# Patient Record
Sex: FEMALE | Race: WHITE | Employment: OTHER | URBAN - METROPOLITAN AREA
[De-identification: names, ages, dates, MRNs, and addresses within clinical notes are randomized per-mention and may not be internally consistent; named-entity substitution may affect disease eponyms.]

---

## 2017-02-02 ENCOUNTER — OFFICE VISIT (OUTPATIENT)
Dept: FAMILY MEDICINE CLINIC | Facility: CLINIC | Age: 47
End: 2017-02-02

## 2017-02-02 VITALS
TEMPERATURE: 98 F | RESPIRATION RATE: 16 BRPM | DIASTOLIC BLOOD PRESSURE: 70 MMHG | OXYGEN SATURATION: 98 % | BODY MASS INDEX: 21.07 KG/M2 | HEART RATE: 62 BPM | HEIGHT: 68 IN | WEIGHT: 139 LBS | SYSTOLIC BLOOD PRESSURE: 100 MMHG

## 2017-02-02 DIAGNOSIS — R93.1 ABNORMAL HEART SCORE CT: ICD-10-CM

## 2017-02-02 DIAGNOSIS — E55.9 VITAMIN D DEFICIENCY: ICD-10-CM

## 2017-02-02 DIAGNOSIS — Z82.49 FAMILY HISTORY OF CORONARY ARTERY DISEASE: ICD-10-CM

## 2017-02-02 DIAGNOSIS — E78.00 HYPERCHOLESTEREMIA: Primary | ICD-10-CM

## 2017-02-02 PROCEDURE — 99214 OFFICE O/P EST MOD 30 MIN: CPT | Performed by: FAMILY MEDICINE

## 2017-02-02 RX ORDER — ATORVASTATIN CALCIUM 10 MG/1
10 TABLET, FILM COATED ORAL DAILY
Qty: 30 TABLET | Refills: 2 | Status: SHIPPED | OUTPATIENT
Start: 2017-02-02 | End: 2018-02-26

## 2017-02-02 NOTE — PROGRESS NOTES
Dat Copeland is a 55year old female. cc hyperlipidemia abnormal ultrafast CAT scan results, vitamin D deficiency family history of coronary artery disease. HPI:   Patient is coming to discuss hyperlipidemia. Her cholesterol is always elevated.   She is a lesions  HEENT: atraumatic, normocephalic,ears and throat are clear  NECK: supple,no adenopathy,  LUNGS: clear to auscultation  CARDIO: RRR without murmur  GI: good BS's,no masses, HSM or tenderness  EXTREMITIES: no cyanosis, clubbing or edema  Psychiatric

## 2017-02-02 NOTE — PATIENT INSTRUCTIONS
Do fasting blood work. Start atorvastatin 10 mg 1 tablet daily in the morning after you hear from us about your results. Continue exercise. Low-fat diet. Follow-up in 3 months after blood work. Vitamin D  Does this test have other names?   25-hydroxyvi provider may also want to check your parathyroid hormone levels and your calcium levels.   What do my test results mean?   A result for a lab test may be affected by many things, including the method the laboratory uses to do the test. If your test results taking. This includes medicines that don't need a prescription and any illicit drugs you may use.   Date Last Reviewed: 10/12/2015  © 6314-1549 The [facility], [address] [place] [address]. All rights reserved.  This information is n pain, upset  What may interact with this medicine?   Do not take this medicine with any of the following medications:  · red yeast rice  · telaprevir  · telithromycin  · voriconazole  This medicine may also interact with the following medications:  · alcoho professional may tell you to stop taking this medicine if you develop muscle problems.  If your muscle problems do not go away after stopping this medicine, contact your health care professional.  This drug is only part of a total heart-health program. Your

## 2018-01-31 ENCOUNTER — APPOINTMENT (OUTPATIENT)
Dept: LAB | Age: 48
End: 2018-01-31
Attending: FAMILY MEDICINE
Payer: COMMERCIAL

## 2018-01-31 DIAGNOSIS — E78.00 HYPERCHOLESTEREMIA: ICD-10-CM

## 2018-01-31 DIAGNOSIS — E55.9 VITAMIN D DEFICIENCY: ICD-10-CM

## 2018-01-31 LAB
25-HYDROXYVITAMIN D (TOTAL): 44.7 NG/ML (ref 30–100)
ALBUMIN SERPL-MCNC: 3.8 G/DL (ref 3.5–4.8)
ALP LIVER SERPL-CCNC: 60 U/L (ref 39–100)
ALT SERPL-CCNC: 22 U/L (ref 14–54)
AST SERPL-CCNC: 12 U/L (ref 15–41)
BILIRUB SERPL-MCNC: 0.7 MG/DL (ref 0.1–2)
BUN BLD-MCNC: 9 MG/DL (ref 8–20)
CALCIUM BLD-MCNC: 9.3 MG/DL (ref 8.3–10.3)
CHLORIDE: 107 MMOL/L (ref 101–111)
CHOLEST SMN-MCNC: 222 MG/DL (ref ?–200)
CO2: 26 MMOL/L (ref 22–32)
CREAT BLD-MCNC: 0.71 MG/DL (ref 0.55–1.02)
GLUCOSE BLD-MCNC: 73 MG/DL (ref 70–99)
HDLC SERPL-MCNC: 93 MG/DL (ref 45–?)
HDLC SERPL: 2.39 {RATIO} (ref ?–4.44)
LDLC SERPL CALC-MCNC: 117 MG/DL (ref ?–130)
M PROTEIN MFR SERPL ELPH: 7.6 G/DL (ref 6.1–8.3)
NONHDLC SERPL-MCNC: 129 MG/DL (ref ?–130)
POTASSIUM SERPL-SCNC: 4.3 MMOL/L (ref 3.6–5.1)
SODIUM SERPL-SCNC: 139 MMOL/L (ref 136–144)
TRIGL SERPL-MCNC: 61 MG/DL (ref ?–150)
VLDLC SERPL CALC-MCNC: 12 MG/DL (ref 5–40)

## 2018-01-31 PROCEDURE — 36415 COLL VENOUS BLD VENIPUNCTURE: CPT | Performed by: FAMILY MEDICINE

## 2018-01-31 PROCEDURE — 80061 LIPID PANEL: CPT | Performed by: FAMILY MEDICINE

## 2018-01-31 PROCEDURE — 80053 COMPREHEN METABOLIC PANEL: CPT | Performed by: FAMILY MEDICINE

## 2018-01-31 PROCEDURE — 82306 VITAMIN D 25 HYDROXY: CPT | Performed by: FAMILY MEDICINE

## 2018-02-26 ENCOUNTER — OFFICE VISIT (OUTPATIENT)
Dept: FAMILY MEDICINE CLINIC | Facility: CLINIC | Age: 48
End: 2018-02-26

## 2018-02-26 VITALS
BODY MASS INDEX: 21.03 KG/M2 | HEIGHT: 68.75 IN | DIASTOLIC BLOOD PRESSURE: 68 MMHG | WEIGHT: 142 LBS | SYSTOLIC BLOOD PRESSURE: 116 MMHG | RESPIRATION RATE: 16 BRPM | TEMPERATURE: 98 F | HEART RATE: 98 BPM

## 2018-02-26 DIAGNOSIS — R20.0 NUMBNESS AND TINGLING OF UPPER AND LOWER EXTREMITIES OF BOTH SIDES: ICD-10-CM

## 2018-02-26 DIAGNOSIS — E78.00 HYPERCHOLESTEREMIA: Primary | ICD-10-CM

## 2018-02-26 DIAGNOSIS — R20.2 NUMBNESS AND TINGLING OF UPPER AND LOWER EXTREMITIES OF BOTH SIDES: ICD-10-CM

## 2018-02-26 DIAGNOSIS — Z00.00 LABORATORY TESTS ORDERED AS PART OF A COMPLETE PHYSICAL EXAM (CPE): ICD-10-CM

## 2018-02-26 DIAGNOSIS — M54.2 NECK PAIN: ICD-10-CM

## 2018-02-26 PROCEDURE — 99214 OFFICE O/P EST MOD 30 MIN: CPT | Performed by: FAMILY MEDICINE

## 2018-02-26 RX ORDER — METHYLPREDNISOLONE 4 MG/1
TABLET ORAL
Qty: 1 KIT | Refills: 0 | Status: SHIPPED | OUTPATIENT
Start: 2018-02-26 | End: 2019-08-22 | Stop reason: ALTCHOICE

## 2018-02-26 RX ORDER — CYCLOBENZAPRINE HCL 10 MG
10 TABLET ORAL NIGHTLY
Qty: 30 TABLET | Refills: 0 | Status: SHIPPED | OUTPATIENT
Start: 2018-02-26 | End: 2018-03-28

## 2018-02-26 RX ORDER — ATORVASTATIN CALCIUM 10 MG/1
10 TABLET, FILM COATED ORAL DAILY
Qty: 30 TABLET | Refills: 2 | Status: SHIPPED | OUTPATIENT
Start: 2018-02-26 | End: 2018-05-09

## 2018-02-27 NOTE — PROGRESS NOTES
Harpal Ba is a 52year old female. cc hypercholesterolemia, numbness and tingling of the extremities  HPI:   Coming to discuss hypercholesterolemia. She had a blood work done last year cholesterol was elevated.   Ultrafast heart scan showed some the pos otherwise  SKIN: denies any unusual skin lesions or rashes  HEENT no congestion no runny nose no sore throat  Neck  neck pain  RESPIRATORY: denies shortness of breath with exertion  CARDIOVASCULAR: denies chest pain on exertion  GI: denies abdominal pain a will require MRI of her neck and neurosurgical evaluation. I suggested to use some topical icy hot do not get massages of the neck.     Orders Placed This Encounter      COMP METABOLIC PANEL      LIPID PANEL      TSH W REFLEX TO FREE T4      CBC W DIFFEREN

## 2018-05-10 RX ORDER — ATORVASTATIN CALCIUM 10 MG/1
TABLET, FILM COATED ORAL
Qty: 60 TABLET | Refills: 0 | Status: SHIPPED | OUTPATIENT
Start: 2018-05-10 | End: 2018-09-07

## 2018-06-15 ENCOUNTER — OFFICE VISIT (OUTPATIENT)
Dept: FAMILY MEDICINE CLINIC | Facility: CLINIC | Age: 48
End: 2018-06-15

## 2018-06-15 VITALS
BODY MASS INDEX: 21.37 KG/M2 | DIASTOLIC BLOOD PRESSURE: 72 MMHG | RESPIRATION RATE: 15 BRPM | HEART RATE: 67 BPM | HEIGHT: 68 IN | WEIGHT: 141 LBS | SYSTOLIC BLOOD PRESSURE: 114 MMHG

## 2018-06-15 DIAGNOSIS — Z00.00 PHYSICAL EXAM, ANNUAL: Primary | ICD-10-CM

## 2018-06-15 DIAGNOSIS — R94.6 ABNORMAL FINDING ON EXAMINATION OF THYROID GLAND: ICD-10-CM

## 2018-06-15 PROCEDURE — 99396 PREV VISIT EST AGE 40-64: CPT | Performed by: FAMILY MEDICINE

## 2018-06-15 NOTE — PROGRESS NOTES
HPI:   Cong Roberts is a 50year old female who presents for a complete physical exam. Symptoms: denies discharge, itching, burning or dysuria. Patient has no  Complains.        Immunization History  Administered            Date(s) Administered    [facility] GENERAL: feels well otherwise  SKIN: denies any unusual skin lesions  EYES:denies blurred vision or double vision  HEENT: denies nasal congestion, sinus pain or ST  LUNGS: denies shortness of breath with exertion  CARDIOVASCULAR: denies chest pain on exe maintenance, will check fasting Lipids, CMP, and CBC. Pt will be at 48 referred for screening colonoscopy. Pt' s weight is Body mass index is 21.44 kg/m². , recommended low carb diet and aerobic exercise 30 minutes three times weekly.   The patient indicates

## 2018-08-30 ENCOUNTER — HOSPITAL ENCOUNTER (OUTPATIENT)
Dept: ULTRASOUND IMAGING | Facility: HOSPITAL | Age: 48
Discharge: HOME | End: 2018-08-30
Attending: FAMILY MEDICINE
Payer: COMMERCIAL

## 2018-08-30 DIAGNOSIS — R94.6 ABNORMAL FINDING ON EXAMINATION OF THYROID GLAND: ICD-10-CM

## 2018-08-30 PROCEDURE — 76536 US EXAM OF HEAD AND NECK: CPT | Performed by: FAMILY MEDICINE

## 2018-09-07 ENCOUNTER — TELEPHONE (OUTPATIENT)
Dept: FAMILY MEDICINE CLINIC | Facility: CLINIC | Age: 48
End: 2018-09-07

## 2018-09-07 NOTE — TELEPHONE ENCOUNTER
Per chart u/s of thyroid is normal.  I have advised pt of this. She voiced understanding. Reports she was having trouble logging into her mychart.

## 2018-09-12 RX ORDER — ATORVASTATIN CALCIUM 10 MG/1
TABLET, FILM COATED ORAL
Qty: 30 TABLET | Refills: 0 | Status: SHIPPED | OUTPATIENT
Start: 2018-09-12 | End: 2018-09-12

## 2018-09-13 RX ORDER — ATORVASTATIN CALCIUM 10 MG/1
10 TABLET, FILM COATED ORAL
Qty: 30 TABLET | Refills: 2 | Status: SHIPPED | OUTPATIENT
Start: 2018-09-13 | End: 2019-03-10

## 2018-10-16 ENCOUNTER — LAB ENCOUNTER (OUTPATIENT)
Dept: LAB | Age: 48
End: 2018-10-16
Attending: FAMILY MEDICINE
Payer: COMMERCIAL

## 2018-10-16 DIAGNOSIS — E78.00 HYPERCHOLESTEREMIA: ICD-10-CM

## 2018-10-16 DIAGNOSIS — Z00.00 LABORATORY TESTS ORDERED AS PART OF A COMPLETE PHYSICAL EXAM (CPE): ICD-10-CM

## 2018-10-16 PROCEDURE — 80061 LIPID PANEL: CPT | Performed by: FAMILY MEDICINE

## 2018-10-16 PROCEDURE — 36415 COLL VENOUS BLD VENIPUNCTURE: CPT | Performed by: FAMILY MEDICINE

## 2018-10-16 PROCEDURE — 80050 GENERAL HEALTH PANEL: CPT | Performed by: FAMILY MEDICINE

## 2018-10-16 PROCEDURE — 81003 URINALYSIS AUTO W/O SCOPE: CPT | Performed by: FAMILY MEDICINE

## 2018-10-18 RX ORDER — ATORVASTATIN CALCIUM 10 MG/1
TABLET, FILM COATED ORAL
Qty: 30 TABLET | Refills: 0 | Status: SHIPPED | OUTPATIENT
Start: 2018-10-18 | End: 2019-08-22

## 2019-02-18 ENCOUNTER — TELEPHONE (OUTPATIENT)
Dept: FAMILY MEDICINE CLINIC | Facility: CLINIC | Age: 49
End: 2019-02-18

## 2019-02-18 NOTE — TELEPHONE ENCOUNTER
Pt called. Pt states both her spouse and mother have the Shingles right now. Pt states she's not 48years old yet and that her insurance won't cover the vaccine.   She's aware that we don't have a shingles vaccine right now and she said that she will go t

## 2019-02-18 NOTE — TELEPHONE ENCOUNTER
Patient was advised she can not have the shingles vaccine until she is 50. Patient verbalized understanding.

## 2019-02-18 NOTE — TELEPHONE ENCOUNTER
Unfortunately  shingles  vaccination is not approved for patient below 48years of age. Patient cannot receive the vaccination because  she is too young. .  She cannot get a shingles from her mother-in-law. If she had chickenpox she should be safe.   When

## 2019-02-18 NOTE — TELEPHONE ENCOUNTER
I explained to patient if she has had the chicken pox she can not get the shingles from her mother in law. Shingles is contagious to those who have not had the chicken pox or those that have not been vaccinated for the chicken pox.  Patient is still wanting

## 2019-03-11 RX ORDER — ATORVASTATIN CALCIUM 10 MG/1
TABLET, FILM COATED ORAL
Qty: 30 TABLET | Refills: 0 | Status: SHIPPED | OUTPATIENT
Start: 2019-03-11 | End: 2019-03-22

## 2019-03-22 ENCOUNTER — TELEPHONE (OUTPATIENT)
Dept: FAMILY MEDICINE CLINIC | Facility: CLINIC | Age: 49
End: 2019-03-22

## 2019-03-22 RX ORDER — ATORVASTATIN CALCIUM 10 MG/1
TABLET, FILM COATED ORAL
Qty: 90 TABLET | Refills: 0 | Status: SHIPPED | OUTPATIENT
Start: 2019-03-22 | End: 2019-06-22

## 2019-03-22 NOTE — TELEPHONE ENCOUNTER
Patient would like to know if Dr. Royce Emerson will prescribe 90 days instead of 30      ATORVASTATIN 10 MG Oral Tab    Thank you

## 2019-03-22 NOTE — TELEPHONE ENCOUNTER
See below. Pt had cpx in June. Notes say to return in year. Last lipid 10/18  Can give her 80 and remind for appt?

## 2019-06-26 NOTE — TELEPHONE ENCOUNTER
ATORVASTATIN 10MG TABLETS    Cholesterol Medication Protocol Failed    The pt made an appt for 10/24/2019. Please see pended medications. Please sign if appropriate.       Thank you

## 2019-06-26 NOTE — TELEPHONE ENCOUNTER
Please give patient a call. I would like her to schedule her appointment sooner we can see her at the end of August okay to use 30 minutes for her physical at any time.   Thank you

## 2019-07-02 RX ORDER — ATORVASTATIN CALCIUM 10 MG/1
TABLET, FILM COATED ORAL
Qty: 90 TABLET | Refills: 0 | Status: SHIPPED | OUTPATIENT
Start: 2019-07-02 | End: 2019-09-03

## 2019-08-22 ENCOUNTER — OFFICE VISIT (OUTPATIENT)
Dept: FAMILY MEDICINE CLINIC | Facility: CLINIC | Age: 49
End: 2019-08-22
Payer: COMMERCIAL

## 2019-08-22 VITALS
HEART RATE: 80 BPM | WEIGHT: 142 LBS | RESPIRATION RATE: 14 BRPM | HEIGHT: 68 IN | DIASTOLIC BLOOD PRESSURE: 76 MMHG | SYSTOLIC BLOOD PRESSURE: 108 MMHG | BODY MASS INDEX: 21.52 KG/M2 | OXYGEN SATURATION: 99 % | TEMPERATURE: 99 F

## 2019-08-22 DIAGNOSIS — Z12.11 SCREEN FOR COLON CANCER: ICD-10-CM

## 2019-08-22 DIAGNOSIS — Z13.89 SCREENING FOR GENITOURINARY CONDITION: ICD-10-CM

## 2019-08-22 DIAGNOSIS — E55.9 VITAMIN D DEFICIENCY: ICD-10-CM

## 2019-08-22 DIAGNOSIS — Z00.00 PHYSICAL EXAM, ANNUAL: Primary | ICD-10-CM

## 2019-08-22 DIAGNOSIS — Z00.00 LABORATORY EXAMINATION ORDERED AS PART OF A ROUTINE GENERAL MEDICAL EXAMINATION: ICD-10-CM

## 2019-08-22 PROCEDURE — 99396 PREV VISIT EST AGE 40-64: CPT | Performed by: FAMILY MEDICINE

## 2019-08-22 NOTE — PROGRESS NOTES
HPI:   Stan Angeles is a 52year old female who presents for a complete physical exam. Symptoms: denies discharge, itching, burning or dysuria. Patient has no  Complains. She is taking on cholesterol-lowering medication.   Family history of hypercholester watches calories closely     REVIEW OF SYSTEMS:   GENERAL: feels well otherwise  SKIN: denies any unusual skin lesions  EYES:denies blurred vision or double vision  HEENT: denies nasal congestion, sinus pain or ST  LUNGS: denies shortness of breath with ex W/DIFF      COMP METABOLIC PANEL      LIPID PANEL      TSH W REFLEX TO FREE T4      UA/M with Culture Reflex      VITAMIN D, 25-HYDROXY      Meds & Refills for this Visit:  Requested Prescriptions      No prescriptions requested or ordered in this encounte

## 2019-08-22 NOTE — PATIENT INSTRUCTIONS
Return for fasting blood work. Healthy diet. Stay active. Call GI Dr. Lord Montez  for colonoscopy after your birthday. Come back for your tetanus shot any time. Do shingles shot after your birthday next year.

## 2019-08-27 RX ORDER — ATORVASTATIN CALCIUM 10 MG/1
TABLET, FILM COATED ORAL
Qty: 30 TABLET | Refills: 0 | OUTPATIENT
Start: 2019-08-27

## 2019-09-03 ENCOUNTER — TELEPHONE (OUTPATIENT)
Dept: FAMILY MEDICINE CLINIC | Facility: CLINIC | Age: 49
End: 2019-09-03

## 2019-09-03 RX ORDER — ATORVASTATIN CALCIUM 10 MG/1
TABLET, FILM COATED ORAL
Qty: 90 TABLET | Refills: 0 | Status: SHIPPED | OUTPATIENT
Start: 2019-09-03 | End: 2019-12-03

## 2019-09-03 NOTE — TELEPHONE ENCOUNTER
Per chart 7/2 #90 was sent. I called [facility] to confirm. They never rec'd that refill. I have re-sent for pt.

## 2019-10-28 ENCOUNTER — OFFICE VISIT (OUTPATIENT)
Dept: FAMILY MEDICINE CLINIC | Facility: CLINIC | Age: 49
End: 2019-10-28
Payer: COMMERCIAL

## 2019-10-28 VITALS
OXYGEN SATURATION: 99 % | RESPIRATION RATE: 16 BRPM | HEIGHT: 68 IN | SYSTOLIC BLOOD PRESSURE: 104 MMHG | HEART RATE: 86 BPM | DIASTOLIC BLOOD PRESSURE: 64 MMHG | WEIGHT: 148 LBS | BODY MASS INDEX: 22.43 KG/M2 | TEMPERATURE: 99 F

## 2019-10-28 DIAGNOSIS — M54.2 NECK PAIN: Primary | ICD-10-CM

## 2019-10-28 DIAGNOSIS — M54.12 CERVICAL RADICULOPATHY: ICD-10-CM

## 2019-10-28 PROCEDURE — 99214 OFFICE O/P EST MOD 30 MIN: CPT | Performed by: FAMILY MEDICINE

## 2019-10-28 RX ORDER — METHYLPREDNISOLONE 4 MG/1
TABLET ORAL
Qty: 1 KIT | Refills: 0 | Status: SHIPPED | OUTPATIENT
Start: 2019-10-28 | End: 2020-06-04 | Stop reason: ALTCHOICE

## 2019-10-28 RX ORDER — CYCLOBENZAPRINE HCL 10 MG
10 TABLET ORAL NIGHTLY
Qty: 30 TABLET | Refills: 0 | Status: SHIPPED | OUTPATIENT
Start: 2019-10-28 | End: 2019-11-17

## 2019-10-28 NOTE — PATIENT INSTRUCTIONS
Start Medrol Dosepak tomorrow morning and take per directions. Cyclobenzaprine 10 mg at bedtime this is muscle relaxant take as we discussed in the office. Take rest from tennis right now. Continue warm compresses.   Call  to get an x-ray of

## 2019-10-28 NOTE — PROGRESS NOTES
Sammy Lamas is a 52year old female. cc neck pain, occasional shooting to the left arm. HPI:   She is coming to the office for evaluation of the neck pain. Initially evaluated in September 2016.   At that time she will start a Medrol Dosepak and her symp adult)   Pulse 86   Temp 98.9 °F (37.2 °C) (Oral)   Resp 16   Ht 68\"   Wt 148 lb (67.1 kg)   SpO2 99%   Breastfeeding?  No   BMI 22.50 kg/m²   GENERAL: well developed, well nourished,in no apparent distress  SKIN: no rashes,no suspicious lesions  HEENT: at Radiculopathy, cervical region M54.2 Cervicalgia     PATIENT STATED HISTORY: (As transcribed by Technologist)  Neck  discomfort,pain on and off. No injury.          FINDINGS:      BONES:  Stable alignment with slight reversal of the cervical lordosis centere

## 2019-10-29 ENCOUNTER — HOSPITAL ENCOUNTER (OUTPATIENT)
Dept: GENERAL RADIOLOGY | Age: 49
Discharge: HOME | End: 2019-10-29
Attending: FAMILY MEDICINE
Payer: COMMERCIAL

## 2019-10-29 DIAGNOSIS — M54.12 CERVICAL RADICULOPATHY: ICD-10-CM

## 2019-10-29 DIAGNOSIS — M54.2 NECK PAIN: ICD-10-CM

## 2019-10-29 PROCEDURE — 72052 X-RAY EXAM NECK SPINE 6/>VWS: CPT | Performed by: FAMILY MEDICINE

## 2019-12-06 RX ORDER — ATORVASTATIN CALCIUM 10 MG/1
TABLET, FILM COATED ORAL
Qty: 90 TABLET | Refills: 0 | Status: SHIPPED | OUTPATIENT
Start: 2019-12-06 | End: 2020-04-26

## 2019-12-06 NOTE — TELEPHONE ENCOUNTER
ATORVASTATIN 10MG TABLETS    Cholesterol Medication Protocol Failed    She has an upcoming appt scheduled for 12/19/2019. Please see pended medications. Please sign if appropriate.       Thank you

## 2020-02-03 ENCOUNTER — OFFICE VISIT (OUTPATIENT)
Dept: FAMILY MEDICINE CLINIC | Facility: CLINIC | Age: 50
End: 2020-02-03
Payer: COMMERCIAL

## 2020-02-03 VITALS
BODY MASS INDEX: 23 KG/M2 | SYSTOLIC BLOOD PRESSURE: 100 MMHG | DIASTOLIC BLOOD PRESSURE: 62 MMHG | HEART RATE: 72 BPM | WEIGHT: 148 LBS | OXYGEN SATURATION: 97 % | TEMPERATURE: 98 F

## 2020-02-03 DIAGNOSIS — M54.2 NECK PAIN: Primary | ICD-10-CM

## 2020-02-03 DIAGNOSIS — M54.12 CERVICAL RADICULOPATHY: ICD-10-CM

## 2020-02-03 PROCEDURE — 99213 OFFICE O/P EST LOW 20 MIN: CPT | Performed by: FAMILY MEDICINE

## 2020-02-03 NOTE — PROGRESS NOTES
Sergio Durán is a 52year old female. cc neck pain  HPI:   Patient is come to the office for reevaluation of the neck and upper back pain. Initially evaluated in September 2016.   At that time she will start a Medrol Dosepak and her symptoms got significant (36.4 °C) (Oral)   Wt 148 lb (67.1 kg)   SpO2 97%   BMI 22.50 kg/m²   GENERAL: well developed, well nourished,in no apparent distress  SKIN: no rashes,no suspicious lesions  HEENT: atraumatic, normocephalic,ears and throat are clear  NECK: supple,no adenop

## 2020-02-13 ENCOUNTER — MED REC SCAN ONLY (OUTPATIENT)
Dept: FAMILY MEDICINE CLINIC | Facility: CLINIC | Age: 50
End: 2020-02-13

## 2020-04-26 RX ORDER — ATORVASTATIN CALCIUM 10 MG/1
TABLET, FILM COATED ORAL
Qty: 90 TABLET | Refills: 0 | Status: SHIPPED | OUTPATIENT
Start: 2020-04-26 | End: 2020-07-29

## 2020-06-03 ENCOUNTER — TELEPHONE (OUTPATIENT)
Dept: FAMILY MEDICINE CLINIC | Facility: CLINIC | Age: 50
End: 2020-06-03

## 2020-06-03 NOTE — TELEPHONE ENCOUNTER
1. What are your symptoms? Pt has shooting pain by ribs that radiates to her chest and makes it hard to take a deep breath. 2. How long have you been having these symptoms?  sunday      3. Have you done anything already to treat your symptoms?

## 2020-06-03 NOTE — TELEPHONE ENCOUNTER
S/w pt. Reports a shooting pain in back near her ribs and radiates to chest.  sts her sx's come and goes. Since Sunday. Reports she has been looking up her sx's online and wonders if she has \"slipping rib syndrome\" or \"costochodritis\".   sts its very

## 2020-06-04 ENCOUNTER — HOSPITAL ENCOUNTER (OUTPATIENT)
Dept: CT IMAGING | Facility: HOSPITAL | Age: 50
Discharge: HOME | End: 2020-06-04
Attending: FAMILY MEDICINE
Payer: COMMERCIAL

## 2020-06-04 ENCOUNTER — LAB ENCOUNTER (OUTPATIENT)
Dept: LAB | Facility: HOSPITAL | Age: 50
End: 2020-06-04
Attending: FAMILY MEDICINE
Payer: COMMERCIAL

## 2020-06-04 ENCOUNTER — OFFICE VISIT (OUTPATIENT)
Dept: FAMILY MEDICINE CLINIC | Facility: CLINIC | Age: 50
End: 2020-06-04
Payer: COMMERCIAL

## 2020-06-04 VITALS
TEMPERATURE: 99 F | RESPIRATION RATE: 16 BRPM | HEIGHT: 68 IN | WEIGHT: 145 LBS | BODY MASS INDEX: 21.98 KG/M2 | SYSTOLIC BLOOD PRESSURE: 106 MMHG | DIASTOLIC BLOOD PRESSURE: 64 MMHG

## 2020-06-04 DIAGNOSIS — D72.829 LEUKOCYTOSIS, UNSPECIFIED TYPE: ICD-10-CM

## 2020-06-04 DIAGNOSIS — R10.9 ACUTE RIGHT FLANK PAIN: ICD-10-CM

## 2020-06-04 DIAGNOSIS — R10.811 RIGHT UPPER QUADRANT ABDOMINAL TENDERNESS WITHOUT REBOUND TENDERNESS: Primary | ICD-10-CM

## 2020-06-04 DIAGNOSIS — R10.811 RIGHT UPPER QUADRANT ABDOMINAL TENDERNESS WITHOUT REBOUND TENDERNESS: ICD-10-CM

## 2020-06-04 PROCEDURE — 80053 COMPREHEN METABOLIC PANEL: CPT

## 2020-06-04 PROCEDURE — 87086 URINE CULTURE/COLONY COUNT: CPT

## 2020-06-04 PROCEDURE — 81003 URINALYSIS AUTO W/O SCOPE: CPT | Performed by: FAMILY MEDICINE

## 2020-06-04 PROCEDURE — 82565 ASSAY OF CREATININE: CPT

## 2020-06-04 PROCEDURE — 85025 COMPLETE CBC W/AUTO DIFF WBC: CPT

## 2020-06-04 PROCEDURE — 36415 COLL VENOUS BLD VENIPUNCTURE: CPT

## 2020-06-04 PROCEDURE — 99214 OFFICE O/P EST MOD 30 MIN: CPT | Performed by: FAMILY MEDICINE

## 2020-06-04 PROCEDURE — 83690 ASSAY OF LIPASE: CPT

## 2020-06-04 PROCEDURE — 74177 CT ABD & PELVIS W/CONTRAST: CPT | Performed by: FAMILY MEDICINE

## 2020-06-04 RX ORDER — FAMOTIDINE 20 MG/1
20 TABLET ORAL 2 TIMES DAILY
Qty: 30 TABLET | Refills: 0 | Status: SHIPPED | OUTPATIENT
Start: 2020-06-04 | End: 2021-10-29 | Stop reason: ALTCHOICE

## 2020-06-04 RX ORDER — DICLOFENAC SODIUM 75 MG/1
75 TABLET, DELAYED RELEASE ORAL 2 TIMES DAILY
Qty: 30 TABLET | Refills: 0 | Status: SHIPPED | OUTPATIENT
Start: 2020-06-04 | End: 2021-05-10

## 2020-06-04 NOTE — TELEPHONE ENCOUNTER
We could take a look at her symptoms at 9:15 AM tomorrow please double book.   I was waiting so far for any cancellations of the I would have any cancellation for tomorrow we may call her back because will have more time for appointment I would like patient

## 2020-06-04 NOTE — PATIENT INSTRUCTIONS
Start diclofenac 75 mg 1 tablet twice a day take it with food. Famotidine 20 mg 1 tablet twice a day this is to decrease stomach acid. Healthy diet. Keep good hydration.   Monitor symptoms

## 2020-06-04 NOTE — PROGRESS NOTES
Lillie Martin is a 48year old female. cc right flank pain, right upper quadrant abdominal pain,  HPI:   Patient is come to the office complaining of having pain which started on the right side of the lower back on Sunday.   She says that she was playing a l breath with exertion  CARDIOVASCULAR: denies chest pain on exertion  GI: Right upper quadrant right-sided abdominal pain and denies heartburn, also having flank pain on the right side  NEURO: denies headaches    EXAM:   /64 (BP Location: Left arm, Pa the diaphragm to the pubic symphysis with non-ionic intravenous contrast material. Post contrast coronal MPR imaging was performed. Dose reduction techniques were used.  Dose information is   transmitted to the [facility] [employer] [facility]) Cindy Linton (Cherise auto without Micro      CBC W Differential W Platelet [E]      Urine Culture, Routine [E]      Meds & Refills for this Visit:  Requested Prescriptions     Signed Prescriptions Disp Refills   • Diclofenac Sodium 75 MG Oral Tab EC 30 tablet 0     Sig: Take 1

## 2020-07-29 NOTE — TELEPHONE ENCOUNTER
Call from pharmacy requesting status of refill request.   Rafael Vora pt is due for med visit-will contact her to schedule and forward request to .     Record shows only acute visits since 8/22/19 cpx  Med last ordered 4/26/2020    Call to pt's cell reaches

## 2020-07-30 RX ORDER — ATORVASTATIN CALCIUM 10 MG/1
10 TABLET, FILM COATED ORAL DAILY
Qty: 30 TABLET | Refills: 0 | Status: SHIPPED | OUTPATIENT
Start: 2020-07-30 | End: 2020-09-30

## 2020-09-28 ENCOUNTER — LAB ENCOUNTER (OUTPATIENT)
Dept: LAB | Age: 50
End: 2020-09-28
Attending: FAMILY MEDICINE
Payer: COMMERCIAL

## 2020-09-28 DIAGNOSIS — D72.829 LEUKOCYTOSIS, UNSPECIFIED TYPE: ICD-10-CM

## 2020-09-28 DIAGNOSIS — R10.9 ACUTE RIGHT FLANK PAIN: ICD-10-CM

## 2020-09-28 DIAGNOSIS — R10.811 RIGHT UPPER QUADRANT ABDOMINAL TENDERNESS WITHOUT REBOUND TENDERNESS: ICD-10-CM

## 2020-09-28 LAB
BASOPHILS # BLD AUTO: 0.13 X10(3) UL (ref 0–0.2)
BASOPHILS NFR BLD AUTO: 1.8 %
DEPRECATED RDW RBC AUTO: 44.1 FL (ref 35.1–46.3)
EOSINOPHIL # BLD AUTO: 0.82 X10(3) UL (ref 0–0.7)
EOSINOPHIL NFR BLD AUTO: 11.2 %
ERYTHROCYTE [DISTWIDTH] IN BLOOD BY AUTOMATED COUNT: 12.8 % (ref 11–15)
HCT VFR BLD AUTO: 38.3 %
HGB BLD-MCNC: 12.6 G/DL
IMM GRANULOCYTES # BLD AUTO: 0.01 X10(3) UL (ref 0–1)
IMM GRANULOCYTES NFR BLD: 0.1 %
LYMPHOCYTES # BLD AUTO: 2.64 X10(3) UL (ref 1–4)
LYMPHOCYTES NFR BLD AUTO: 36.2 %
MCH RBC QN AUTO: 31.3 PG (ref 26–34)
MCHC RBC AUTO-ENTMCNC: 32.9 G/DL (ref 31–37)
MCV RBC AUTO: 95 FL
MONOCYTES # BLD AUTO: 0.75 X10(3) UL (ref 0.1–1)
MONOCYTES NFR BLD AUTO: 10.3 %
NEUTROPHILS # BLD AUTO: 2.94 X10 (3) UL (ref 1.5–7.7)
NEUTROPHILS # BLD AUTO: 2.94 X10(3) UL (ref 1.5–7.7)
NEUTROPHILS NFR BLD AUTO: 40.4 %
PLATELET # BLD AUTO: 345 10(3)UL (ref 150–450)
RBC # BLD AUTO: 4.03 X10(6)UL
WBC # BLD AUTO: 7.3 X10(3) UL (ref 4–11)

## 2020-09-28 PROCEDURE — 85025 COMPLETE CBC W/AUTO DIFF WBC: CPT | Performed by: FAMILY MEDICINE

## 2020-09-28 PROCEDURE — 36415 COLL VENOUS BLD VENIPUNCTURE: CPT | Performed by: FAMILY MEDICINE

## 2020-09-30 ENCOUNTER — OFFICE VISIT (OUTPATIENT)
Dept: FAMILY MEDICINE CLINIC | Facility: CLINIC | Age: 50
End: 2020-09-30
Payer: COMMERCIAL

## 2020-09-30 VITALS
TEMPERATURE: 98 F | OXYGEN SATURATION: 97 % | RESPIRATION RATE: 16 BRPM | HEART RATE: 77 BPM | SYSTOLIC BLOOD PRESSURE: 110 MMHG | DIASTOLIC BLOOD PRESSURE: 76 MMHG | WEIGHT: 147 LBS | BODY MASS INDEX: 22.28 KG/M2 | HEIGHT: 68 IN

## 2020-09-30 DIAGNOSIS — Z13.89 SCREENING FOR GENITOURINARY CONDITION: ICD-10-CM

## 2020-09-30 DIAGNOSIS — Z12.11 SCREEN FOR COLON CANCER: ICD-10-CM

## 2020-09-30 DIAGNOSIS — E55.9 VITAMIN D DEFICIENCY: ICD-10-CM

## 2020-09-30 DIAGNOSIS — Z23 NEED FOR VACCINATION: ICD-10-CM

## 2020-09-30 DIAGNOSIS — Z00.00 LABORATORY EXAMINATION ORDERED AS PART OF A ROUTINE GENERAL MEDICAL EXAMINATION: ICD-10-CM

## 2020-09-30 DIAGNOSIS — Z00.00 PHYSICAL EXAM, ANNUAL: Primary | ICD-10-CM

## 2020-09-30 PROCEDURE — 3008F BODY MASS INDEX DOCD: CPT | Performed by: FAMILY MEDICINE

## 2020-09-30 PROCEDURE — 3074F SYST BP LT 130 MM HG: CPT | Performed by: FAMILY MEDICINE

## 2020-09-30 PROCEDURE — 90472 IMMUNIZATION ADMIN EACH ADD: CPT | Performed by: FAMILY MEDICINE

## 2020-09-30 PROCEDURE — 90471 IMMUNIZATION ADMIN: CPT | Performed by: FAMILY MEDICINE

## 2020-09-30 PROCEDURE — 90686 IIV4 VACC NO PRSV 0.5 ML IM: CPT | Performed by: FAMILY MEDICINE

## 2020-09-30 PROCEDURE — 99396 PREV VISIT EST AGE 40-64: CPT | Performed by: FAMILY MEDICINE

## 2020-09-30 PROCEDURE — 90750 HZV VACC RECOMBINANT IM: CPT | Performed by: FAMILY MEDICINE

## 2020-09-30 PROCEDURE — 3078F DIAST BP <80 MM HG: CPT | Performed by: FAMILY MEDICINE

## 2020-09-30 RX ORDER — ATORVASTATIN CALCIUM 10 MG/1
10 TABLET, FILM COATED ORAL DAILY
Qty: 30 TABLET | Refills: 5 | Status: SHIPPED | OUTPATIENT
Start: 2020-09-30 | End: 2021-03-29

## 2020-09-30 NOTE — PROGRESS NOTES
HPI:   Lillie Martin is a 48year old female who presents for a complete physical exam. Symptoms: denies discharge, itching, burning or dysuria. Patient has no  Complains. She is taking cholesterol-lowering medication.   Family history of hypercholesterole • Cancer Mother 28        breast ca    • Lipids Mother    • Other (TIA) Mother    • Other (edgard) Mother    • Lipids Son    • Other (CVA) Maternal Grandmother    • Cancer Maternal Grandfather         LUNG CA   • Heart Disorder Paternal Grandmother back  EXTREMITIES: no cyanosis, clubbing or edema  NEURO: Oriented times three,cranial nerves are intact,motor and sensory are grossly intact    ASSESSMENT AND PLAN:   Gerber Vega is a 48year old female who presents for a complete physical exam.   Physi

## 2021-03-26 ENCOUNTER — LAB ENCOUNTER (OUTPATIENT)
Dept: LAB | Age: 51
End: 2021-03-26
Attending: FAMILY MEDICINE
Payer: COMMERCIAL

## 2021-03-26 DIAGNOSIS — Z13.89 SCREENING FOR GENITOURINARY CONDITION: ICD-10-CM

## 2021-03-26 DIAGNOSIS — E55.9 VITAMIN D DEFICIENCY: ICD-10-CM

## 2021-03-26 DIAGNOSIS — Z00.00 LABORATORY EXAMINATION ORDERED AS PART OF A ROUTINE GENERAL MEDICAL EXAMINATION: ICD-10-CM

## 2021-03-26 LAB
ALBUMIN SERPL-MCNC: 3.7 G/DL (ref 3.4–5)
ALBUMIN/GLOB SERPL: 1 {RATIO} (ref 1–2)
ALP LIVER SERPL-CCNC: 67 U/L
ALT SERPL-CCNC: 20 U/L
ANION GAP SERPL CALC-SCNC: 5 MMOL/L (ref 0–18)
AST SERPL-CCNC: 11 U/L (ref 15–37)
BILIRUB SERPL-MCNC: 0.5 MG/DL (ref 0.1–2)
BILIRUB UR QL STRIP.AUTO: NEGATIVE
BUN BLD-MCNC: 11 MG/DL (ref 7–18)
BUN/CREAT SERPL: 15.9 (ref 10–20)
CALCIUM BLD-MCNC: 9.1 MG/DL (ref 8.5–10.1)
CHLORIDE SERPL-SCNC: 108 MMOL/L (ref 98–112)
CHOLEST SMN-MCNC: 168 MG/DL (ref ?–200)
CO2 SERPL-SCNC: 26 MMOL/L (ref 21–32)
COLOR UR AUTO: YELLOW
CREAT BLD-MCNC: 0.69 MG/DL
GLOBULIN PLAS-MCNC: 3.6 G/DL (ref 2.8–4.4)
GLUCOSE BLD-MCNC: 86 MG/DL (ref 70–99)
GLUCOSE UR STRIP.AUTO-MCNC: NEGATIVE MG/DL
HDLC SERPL-MCNC: 99 MG/DL (ref 40–59)
KETONES UR STRIP.AUTO-MCNC: NEGATIVE MG/DL
LDLC SERPL CALC-MCNC: 59 MG/DL (ref ?–100)
LEUKOCYTE ESTERASE UR QL STRIP.AUTO: NEGATIVE
M PROTEIN MFR SERPL ELPH: 7.3 G/DL (ref 6.4–8.2)
NITRITE UR QL STRIP.AUTO: NEGATIVE
NONHDLC SERPL-MCNC: 69 MG/DL (ref ?–130)
OSMOLALITY SERPL CALC.SUM OF ELEC: 287 MOSM/KG (ref 275–295)
PATIENT FASTING Y/N/NP: YES
PATIENT FASTING Y/N/NP: YES
PH UR STRIP.AUTO: 7 [PH] (ref 5–8)
POTASSIUM SERPL-SCNC: 4 MMOL/L (ref 3.5–5.1)
PROT UR STRIP.AUTO-MCNC: NEGATIVE MG/DL
RBC UR QL AUTO: NEGATIVE
SODIUM SERPL-SCNC: 139 MMOL/L (ref 136–145)
SP GR UR STRIP.AUTO: 1.02 (ref 1–1.03)
TRIGL SERPL-MCNC: 51 MG/DL (ref 30–149)
TSI SER-ACNC: 0.87 MIU/ML (ref 0.36–3.74)
UROBILINOGEN UR STRIP.AUTO-MCNC: <2 MG/DL
VIT D+METAB SERPL-MCNC: 43.5 NG/ML (ref 30–100)
VLDLC SERPL CALC-MCNC: 10 MG/DL (ref 0–30)

## 2021-03-26 PROCEDURE — 80053 COMPREHEN METABOLIC PANEL: CPT | Performed by: FAMILY MEDICINE

## 2021-03-26 PROCEDURE — 80061 LIPID PANEL: CPT | Performed by: FAMILY MEDICINE

## 2021-03-26 PROCEDURE — 84443 ASSAY THYROID STIM HORMONE: CPT | Performed by: FAMILY MEDICINE

## 2021-03-26 PROCEDURE — 82306 VITAMIN D 25 HYDROXY: CPT | Performed by: FAMILY MEDICINE

## 2021-03-26 PROCEDURE — 36415 COLL VENOUS BLD VENIPUNCTURE: CPT | Performed by: FAMILY MEDICINE

## 2021-03-26 PROCEDURE — 81003 URINALYSIS AUTO W/O SCOPE: CPT | Performed by: FAMILY MEDICINE

## 2021-03-29 RX ORDER — ATORVASTATIN CALCIUM 10 MG/1
10 TABLET, FILM COATED ORAL DAILY
Qty: 30 TABLET | Refills: 0 | Status: SHIPPED | OUTPATIENT
Start: 2021-03-29 | End: 2021-04-29

## 2021-03-29 RX ORDER — ATORVASTATIN CALCIUM 10 MG/1
TABLET, FILM COATED ORAL
Qty: 90 TABLET | Refills: 0 | OUTPATIENT
Start: 2021-03-29

## 2021-04-07 ENCOUNTER — TELEPHONE (OUTPATIENT)
Dept: FAMILY MEDICINE CLINIC | Facility: CLINIC | Age: 51
End: 2021-04-07

## 2021-04-07 NOTE — TELEPHONE ENCOUNTER
Left a message for the pt to check her MyChart for her normal mammogram results. Pt to repeat in 1 year.

## 2021-04-08 ENCOUNTER — MED REC SCAN ONLY (OUTPATIENT)
Dept: FAMILY MEDICINE CLINIC | Facility: CLINIC | Age: 51
End: 2021-04-08

## 2021-04-29 RX ORDER — ATORVASTATIN CALCIUM 10 MG/1
TABLET, FILM COATED ORAL
Qty: 30 TABLET | Refills: 0 | Status: SHIPPED | OUTPATIENT
Start: 2021-04-29 | End: 2021-06-14

## 2021-05-10 ENCOUNTER — OFFICE VISIT (OUTPATIENT)
Dept: FAMILY MEDICINE CLINIC | Facility: CLINIC | Age: 51
End: 2021-05-10
Payer: COMMERCIAL

## 2021-05-10 VITALS
HEIGHT: 68 IN | SYSTOLIC BLOOD PRESSURE: 94 MMHG | TEMPERATURE: 99 F | RESPIRATION RATE: 14 BRPM | OXYGEN SATURATION: 98 % | DIASTOLIC BLOOD PRESSURE: 60 MMHG | WEIGHT: 151 LBS | HEART RATE: 69 BPM | BODY MASS INDEX: 22.88 KG/M2

## 2021-05-10 DIAGNOSIS — B02.9 HERPES ZOSTER WITHOUT COMPLICATION: Primary | ICD-10-CM

## 2021-05-10 PROCEDURE — 3074F SYST BP LT 130 MM HG: CPT | Performed by: FAMILY MEDICINE

## 2021-05-10 PROCEDURE — 3078F DIAST BP <80 MM HG: CPT | Performed by: FAMILY MEDICINE

## 2021-05-10 PROCEDURE — 99213 OFFICE O/P EST LOW 20 MIN: CPT | Performed by: FAMILY MEDICINE

## 2021-05-10 PROCEDURE — 3008F BODY MASS INDEX DOCD: CPT | Performed by: FAMILY MEDICINE

## 2021-05-11 ENCOUNTER — MED REC SCAN ONLY (OUTPATIENT)
Dept: FAMILY MEDICINE CLINIC | Facility: CLINIC | Age: 51
End: 2021-05-11

## 2021-05-11 NOTE — PROGRESS NOTES
Elidia Alanis is a 46year old female. cc right upper shoulder rash  HPI:   Patient complains of having lesions which she started on the right upper shoulder 10 days ago. She was driving in her car.   Was wondering if she had some insect bite but besides a 22.96 kg/m²   GENERAL: well developed, well nourished,in no apparent distress  SKIN: Papular rash on the right upper shoulder ,  few lesions which are improving per patient, no vesicles no tenderness  HEENT: atraumatic, normocephalic,  NECK: supple,no gisselle

## 2021-05-11 NOTE — PATIENT INSTRUCTIONS
Monitor lesions. Anticipate resolution. Comminution topically as needed. Use ibuprofen or Tylenol as needed. At any point if symptoms will be worsening call office for reevaluation. Return for shingles shot #2 in 2-3  months.

## 2021-06-14 RX ORDER — ATORVASTATIN CALCIUM 10 MG/1
TABLET, FILM COATED ORAL
Qty: 90 TABLET | Refills: 0 | Status: SHIPPED | OUTPATIENT
Start: 2021-06-14 | End: 2021-09-13

## 2021-09-13 RX ORDER — ATORVASTATIN CALCIUM 10 MG/1
TABLET, FILM COATED ORAL
Qty: 90 TABLET | Refills: 0 | Status: SHIPPED | OUTPATIENT
Start: 2021-09-13 | End: 2021-12-13

## 2021-09-13 NOTE — TELEPHONE ENCOUNTER
Refill request for   ATORVASTATIN 10 MG Oral Tab 90 tablet 0     Medication passes protocol, sent to pharmacy,

## 2021-10-27 ENCOUNTER — TELEPHONE (OUTPATIENT)
Dept: FAMILY MEDICINE CLINIC | Facility: CLINIC | Age: 51
End: 2021-10-27

## 2021-10-27 NOTE — TELEPHONE ENCOUNTER
Pt requesting appt through [facility]:      Appointment Request From: Sammy Lamas      With Provider: Aneudy Saleh MD [facility], [address] [place] [address]]      Preferred Date Range: 10/26/2021 – 11/23/2021      Preferred Times: Any Time

## 2021-10-28 ENCOUNTER — TELEPHONE (OUTPATIENT)
Dept: FAMILY MEDICINE CLINIC | Facility: CLINIC | Age: 51
End: 2021-10-28

## 2021-10-28 NOTE — TELEPHONE ENCOUNTER
I called and spoke to pt. She has been having chest tightness for the past month. She denies chest pain and denies shortness of breath. She has no left arm pain and no jaw pain.  I spoke to Dr. Mary Julien and she will see the pt tomorrow 10/29/2021 at 10:0

## 2021-10-28 NOTE — TELEPHONE ENCOUNTER
1. What are your symptoms? Chest tightness     2. How long have you been having these symptoms? 1 mo    3. Have you done anything already to treat your symptoms?          ADDITIONAL INFO:

## 2021-10-29 ENCOUNTER — EKG ENCOUNTER (OUTPATIENT)
Dept: LAB | Age: 51
End: 2021-10-29
Attending: FAMILY MEDICINE
Payer: COMMERCIAL

## 2021-10-29 ENCOUNTER — OFFICE VISIT (OUTPATIENT)
Dept: FAMILY MEDICINE CLINIC | Facility: CLINIC | Age: 51
End: 2021-10-29
Payer: COMMERCIAL

## 2021-10-29 VITALS
HEIGHT: 68 IN | DIASTOLIC BLOOD PRESSURE: 68 MMHG | HEART RATE: 64 BPM | RESPIRATION RATE: 16 BRPM | SYSTOLIC BLOOD PRESSURE: 100 MMHG | BODY MASS INDEX: 22.58 KG/M2 | TEMPERATURE: 99 F | WEIGHT: 149 LBS

## 2021-10-29 DIAGNOSIS — E78.00 HYPERCHOLESTEREMIA: ICD-10-CM

## 2021-10-29 DIAGNOSIS — R00.2 PALPITATIONS: ICD-10-CM

## 2021-10-29 DIAGNOSIS — Z82.49 FAMILY HISTORY OF CORONARY ARTERY DISEASE IN FATHER: ICD-10-CM

## 2021-10-29 DIAGNOSIS — R07.9 CHEST PAIN, UNSPECIFIED TYPE: ICD-10-CM

## 2021-10-29 DIAGNOSIS — F41.1 GAD (GENERALIZED ANXIETY DISORDER): ICD-10-CM

## 2021-10-29 DIAGNOSIS — Z23 NEED FOR VACCINATION: ICD-10-CM

## 2021-10-29 DIAGNOSIS — R00.1 BRADYCARDIA: ICD-10-CM

## 2021-10-29 DIAGNOSIS — R07.9 CHEST PAIN, UNSPECIFIED TYPE: Primary | ICD-10-CM

## 2021-10-29 DIAGNOSIS — F32.1 DEPRESSION, MAJOR, SINGLE EPISODE, MODERATE (HCC): ICD-10-CM

## 2021-10-29 PROCEDURE — 90471 IMMUNIZATION ADMIN: CPT | Performed by: FAMILY MEDICINE

## 2021-10-29 PROCEDURE — 93010 ELECTROCARDIOGRAM REPORT: CPT | Performed by: INTERNAL MEDICINE

## 2021-10-29 PROCEDURE — 3008F BODY MASS INDEX DOCD: CPT | Performed by: FAMILY MEDICINE

## 2021-10-29 PROCEDURE — 99214 OFFICE O/P EST MOD 30 MIN: CPT | Performed by: FAMILY MEDICINE

## 2021-10-29 PROCEDURE — 3078F DIAST BP <80 MM HG: CPT | Performed by: FAMILY MEDICINE

## 2021-10-29 PROCEDURE — 90686 IIV4 VACC NO PRSV 0.5 ML IM: CPT | Performed by: FAMILY MEDICINE

## 2021-10-29 PROCEDURE — 93005 ELECTROCARDIOGRAM TRACING: CPT

## 2021-10-29 PROCEDURE — 3074F SYST BP LT 130 MM HG: CPT | Performed by: FAMILY MEDICINE

## 2021-10-29 RX ORDER — ESCITALOPRAM OXALATE 5 MG/1
5 TABLET ORAL DAILY
Qty: 30 TABLET | Refills: 0 | Status: SHIPPED | OUTPATIENT
Start: 2021-10-29 | End: 2021-11-26

## 2021-10-29 NOTE — PROGRESS NOTES
Black Salcido is a 46year old female. cc chest pain, hypercholesterolemia, palpitations, bradycardia family history of coronary artery disease. HPI:   Patient is coming to the office for evaluation of the chest pain.   She started to have the symptoms 1 to 15.    On PHQ 9 patient today says but over the last 2 weeks nearly every day she has a trouble to concentrate on things. She is feeling down depressed more than half days will be in 2 week, overeating and feeling bad about herself.   Several days will be distress  SKIN: no rashes,no suspicious lesions  HEENT: atraumatic, normocephalic,  NECK: supple,no adenopathy,  LUNGS: clear to auscultation  CARDIO: RRR without murmur  GI: good BS's,no masses, HSM or tenderness  EXTREMITIES: no  Edema  Psychiatric - mari dose.  Start escitalopram 5 mg 1 tablet daily. Follow-up in 3 to 4 weeks in the office. PHQ-9 score came back at 12  RAYNA-7 score 15.       Imaging & Consults:  FLULAVAL INFLUENZA VACCINE QUAD PRESERVATIVE FREE 0.5 ML  CARD ECHO STRESS ECHO/REST AND STRE

## 2021-10-29 NOTE — PATIENT INSTRUCTIONS
Do EKG today. Call [phone] to schedule stress echo and Holter monitor,   Return for fasting blood work. Monitor symptoms. Continue atorvastatin at current dose. Start escitalopram 5 mg 1 tablet daily. Follow-up in 3 to 4 weeks in the office.

## 2021-11-08 ENCOUNTER — LAB ENCOUNTER (OUTPATIENT)
Dept: LAB | Age: 51
End: 2021-11-08
Attending: FAMILY MEDICINE
Payer: COMMERCIAL

## 2021-11-08 DIAGNOSIS — R07.9 CHEST PAIN, UNSPECIFIED TYPE: ICD-10-CM

## 2021-11-08 DIAGNOSIS — E78.00 HYPERCHOLESTEREMIA: ICD-10-CM

## 2021-11-08 DIAGNOSIS — R00.2 PALPITATIONS: ICD-10-CM

## 2021-11-08 PROCEDURE — 80061 LIPID PANEL: CPT | Performed by: FAMILY MEDICINE

## 2021-11-08 PROCEDURE — 85025 COMPLETE CBC W/AUTO DIFF WBC: CPT | Performed by: FAMILY MEDICINE

## 2021-11-08 PROCEDURE — 80053 COMPREHEN METABOLIC PANEL: CPT | Performed by: FAMILY MEDICINE

## 2021-11-09 ENCOUNTER — MED REC SCAN ONLY (OUTPATIENT)
Dept: FAMILY MEDICINE CLINIC | Facility: CLINIC | Age: 51
End: 2021-11-09

## 2021-11-26 RX ORDER — ESCITALOPRAM OXALATE 5 MG/1
TABLET ORAL
Qty: 30 TABLET | Refills: 0 | Status: SHIPPED | OUTPATIENT
Start: 2021-11-26 | End: 2021-12-04

## 2021-11-26 NOTE — TELEPHONE ENCOUNTER
ESCITALOPRAM 5MG TABLETS    Please see pended medications. Please sign if appropriate.       Thank you      Last OV: 10/29/2021      Last refill: 10/29/2021 for 30 tabs

## 2021-12-06 ENCOUNTER — LAB ENCOUNTER (OUTPATIENT)
Dept: LAB | Facility: HOSPITAL | Age: 51
End: 2021-12-06
Attending: INTERNAL MEDICINE
Payer: COMMERCIAL

## 2021-12-06 DIAGNOSIS — Z01.818 PRE-OP TESTING: ICD-10-CM

## 2021-12-09 PROBLEM — D12.2 BENIGN NEOPLASM OF ASCENDING COLON: Status: ACTIVE | Noted: 2021-12-09

## 2021-12-09 PROBLEM — Z12.11 SPECIAL SCREENING FOR MALIGNANT NEOPLASMS, COLON: Status: ACTIVE | Noted: 2021-12-09

## 2021-12-13 RX ORDER — ATORVASTATIN CALCIUM 10 MG/1
TABLET, FILM COATED ORAL
Qty: 90 TABLET | Refills: 0 | Status: SHIPPED | OUTPATIENT
Start: 2021-12-13

## 2022-03-14 RX ORDER — ATORVASTATIN CALCIUM 10 MG/1
10 TABLET, FILM COATED ORAL DAILY
Qty: 90 TABLET | Refills: 0 | Status: SHIPPED | OUTPATIENT
Start: 2022-03-14 | End: 2022-03-21

## 2022-03-21 RX ORDER — ATORVASTATIN CALCIUM 10 MG/1
TABLET, FILM COATED ORAL
Qty: 90 TABLET | Refills: 0 | Status: SHIPPED | OUTPATIENT
Start: 2022-03-21

## 2022-03-28 ENCOUNTER — TELEPHONE (OUTPATIENT)
Dept: FAMILY MEDICINE CLINIC | Facility: CLINIC | Age: 52
End: 2022-03-28

## 2022-03-28 NOTE — TELEPHONE ENCOUNTER
Call to pt's cell. Reaches identified VM. Per HIPAA consent, LVM requesting call back to triage nurse tomorrow to discuss recommendations. Provided call back number and ofc phone hours.      Dr.Jeffry Dacosta--clinical psychology   [facility]  [address] [address] [facility]  [place], [address] [place] [address] Rd  [phone]

## 2022-03-28 NOTE — TELEPHONE ENCOUNTER
Please give patient contact information for Dr. Delgado Robertson psychologist, I would like her to be evaluated for her ADHD tell me the proper diagnosis and will start treatment as appropriate.   Thank you

## 2022-03-28 NOTE — TELEPHONE ENCOUNTER
Pt states she has scheduled an appt 6/15/2022, but is really struggling with her ADD. Pt asking if Dr. Alfonza Meigs could see her sooner. Please advise.

## 2022-03-30 NOTE — TELEPHONE ENCOUNTER
Call to pt's cell reaches identified voice mail. Per hipaa consent, left vmm advising have left vmm x2 req call back w no response. [facility] Singh Player [facility] Please view tonight then  for appt. Dr Ling Rausch will make recommendations once that consult completed. Call our ofc/speak with a triage nurse if any questions re messg info.  provided ofc phone hours/contact number  **hold for read**

## 2022-04-01 NOTE — TELEPHONE ENCOUNTER
Record shows [employer] message has not been read. One last attempt-call to pt's work # reaches voice mail-requested call back today. Unable to contact letter mailed.

## 2022-04-01 NOTE — TELEPHONE ENCOUNTER
Noted our standard unable to contact letter has incorrect ofc location and phone info. Updated clinical staff in ofc and chetan RN/supervisor  Corrected [facility] to contact [facility] and mailed to home.

## 2022-06-23 ENCOUNTER — TELEPHONE (OUTPATIENT)
Dept: FAMILY MEDICINE CLINIC | Facility: CLINIC | Age: 52
End: 2022-06-23

## 2022-06-23 NOTE — TELEPHONE ENCOUNTER
S/w Bess Gil. Covid + 6/23  Symptoms began on 6/22    Body aches  Congestion  Runny nose  Mild cough  Fatigue  Denies chest pain, tightness, SOB, fevers  Management: Ibuprofen    Currently in [place] She sts she is driving back to [place] on Sat 6/25. Asking if the provider on-call could prescribe Paxlovid, she is aware that  is out of ofc. Advised that need for Paxlovid is determined on a case by case basis, based on pts hx and severity of illness. Will inquire with on-call provider.

## 2022-06-23 NOTE — TELEPHONE ENCOUNTER
S/w Maco Musa Informed her that without eval, unable to prescribe Paxlovid. (Pt was asking to have this sent to her local pharmacy in [place], and she would  upon her return on Sat 6/25). Advised that she should proceed to UC upon her return, so that she can be evaluated and proper management could be determined, I.e. MAB vs Paxlovid, if appropriate.    Provided her with address and hours to [facility] per her request.

## 2022-06-23 NOTE — TELEPHONE ENCOUNTER
Pt developed Covid symptoms yesterday 6/22 and tested negative. Pt tested today for Covid and was positive. Current symptoms:  Congestion, runny nose, sneezing, feels like moving into chest, cough developed this afternoon. Pt is currently out of the country and looking for a flight home. Pt would like a prescription of Paxlovid sent to [facility] pharmacy on file so that she can start taking when she returns on Saturday. Pt aware that Dr Leigh Sales out of office. Please advise if this is possible? WDL

## 2022-06-23 NOTE — TELEPHONE ENCOUNTER
Should go to the IC on Saturday when she returns on 6/25/22 for evaluation and from there they can determine if Paxlovid or antibody infusion appropriate. We can't write for a medication in [place] which she may be aware of.

## 2022-06-26 ENCOUNTER — HOSPITAL ENCOUNTER (OUTPATIENT)
Age: 52
Discharge: HOME | End: 2022-06-26
Payer: COMMERCIAL

## 2022-06-26 VITALS
TEMPERATURE: 99 F | RESPIRATION RATE: 16 BRPM | SYSTOLIC BLOOD PRESSURE: 106 MMHG | HEIGHT: 68 IN | HEART RATE: 55 BPM | BODY MASS INDEX: 20.46 KG/M2 | DIASTOLIC BLOOD PRESSURE: 65 MMHG | OXYGEN SATURATION: 99 % | WEIGHT: 135 LBS

## 2022-06-26 DIAGNOSIS — U07.1 COVID: Primary | ICD-10-CM

## 2022-06-26 PROCEDURE — 99213 OFFICE O/P EST LOW 20 MIN: CPT

## 2022-06-26 RX ORDER — NIRMATRELVIR AND RITONAVIR 300-100 MG
KIT ORAL
Qty: 30 TABLET | Refills: 0 | Status: SHIPPED | OUTPATIENT
Start: 2022-06-26 | End: 2022-07-01

## 2022-06-26 NOTE — ED INITIAL ASSESSMENT (HPI)
Pt here for coughing. States symptoms started on 6/22 and tested positive for covid on 6/23. Denies feeling sob. Denies chest pain. Nausea has improved. Prod cough. Symptoms improving. Pt would like to have lungs listened to.

## 2022-06-28 ENCOUNTER — TELEPHONE (OUTPATIENT)
Dept: FAMILY MEDICINE CLINIC | Facility: CLINIC | Age: 52
End: 2022-06-28

## 2022-06-29 NOTE — TELEPHONE ENCOUNTER
Update:  Spoke to patient and she is much better. All symptoms gone except a mild, lingering dry cough. She is now on Day 7 and is aware if she goes out to continue mask for the next 3 days. She is continuing pushing fluids and current regimen. All questions answered.

## 2022-08-03 RX ORDER — ATORVASTATIN CALCIUM 10 MG/1
10 TABLET, FILM COATED ORAL DAILY
Qty: 90 TABLET | Refills: 0 | Status: SHIPPED | OUTPATIENT
Start: 2022-08-03

## 2022-10-13 ENCOUNTER — OFFICE VISIT (OUTPATIENT)
Dept: OBGYN CLINIC | Facility: CLINIC | Age: 52
End: 2022-10-13
Payer: COMMERCIAL

## 2022-10-13 VITALS
BODY MASS INDEX: 20.82 KG/M2 | DIASTOLIC BLOOD PRESSURE: 80 MMHG | SYSTOLIC BLOOD PRESSURE: 121 MMHG | WEIGHT: 137.38 LBS | HEIGHT: 68 IN

## 2022-10-13 DIAGNOSIS — Z01.419 ENCOUNTER FOR WELL WOMAN EXAM WITH ROUTINE GYNECOLOGICAL EXAM: Primary | ICD-10-CM

## 2022-10-13 DIAGNOSIS — Z28.21 INFLUENZA VACCINE REFUSED: ICD-10-CM

## 2022-10-13 DIAGNOSIS — Z12.31 SCREENING MAMMOGRAM FOR BREAST CANCER: ICD-10-CM

## 2022-10-13 PROCEDURE — 3074F SYST BP LT 130 MM HG: CPT | Performed by: OBSTETRICS & GYNECOLOGY

## 2022-10-13 PROCEDURE — 3008F BODY MASS INDEX DOCD: CPT | Performed by: OBSTETRICS & GYNECOLOGY

## 2022-10-13 PROCEDURE — 99386 PREV VISIT NEW AGE 40-64: CPT | Performed by: OBSTETRICS & GYNECOLOGY

## 2022-10-13 PROCEDURE — 3079F DIAST BP 80-89 MM HG: CPT | Performed by: OBSTETRICS & GYNECOLOGY

## 2022-10-13 RX ORDER — METHYLPHENIDATE HYDROCHLORIDE 10 MG/1
10 TABLET ORAL 2 TIMES DAILY
COMMUNITY
Start: 2022-09-06

## 2022-10-20 ENCOUNTER — MED REC SCAN ONLY (OUTPATIENT)
Dept: FAMILY MEDICINE CLINIC | Facility: CLINIC | Age: 52
End: 2022-10-20

## 2022-10-25 LAB — HPV I/H RISK 1 DNA SPEC QL NAA+PROBE: NEGATIVE

## 2022-11-18 ENCOUNTER — TELEPHONE (OUTPATIENT)
Dept: FAMILY MEDICINE CLINIC | Facility: CLINIC | Age: 52
End: 2022-11-18

## 2022-11-18 RX ORDER — ATORVASTATIN CALCIUM 10 MG/1
10 TABLET, FILM COATED ORAL DAILY
Qty: 90 TABLET | Refills: 0 | Status: SHIPPED | OUTPATIENT
Start: 2022-11-18

## 2022-11-18 RX ORDER — ATORVASTATIN CALCIUM 10 MG/1
10 TABLET, FILM COATED ORAL DAILY
Qty: 90 TABLET | Refills: 0 | OUTPATIENT
Start: 2022-11-18

## 2023-02-06 ENCOUNTER — OFFICE VISIT (OUTPATIENT)
Dept: FAMILY MEDICINE CLINIC | Facility: CLINIC | Age: 53
End: 2023-02-06
Payer: COMMERCIAL

## 2023-02-06 VITALS
HEIGHT: 68 IN | HEART RATE: 78 BPM | SYSTOLIC BLOOD PRESSURE: 100 MMHG | BODY MASS INDEX: 21.22 KG/M2 | RESPIRATION RATE: 18 BRPM | DIASTOLIC BLOOD PRESSURE: 68 MMHG | TEMPERATURE: 98 F | WEIGHT: 140 LBS

## 2023-02-06 DIAGNOSIS — E78.00 HYPERCHOLESTEREMIA: Primary | ICD-10-CM

## 2023-02-06 DIAGNOSIS — B35.3 TINEA PEDIS OF LEFT FOOT: ICD-10-CM

## 2023-02-06 RX ORDER — ATORVASTATIN CALCIUM 10 MG/1
10 TABLET, FILM COATED ORAL DAILY
Qty: 90 TABLET | Refills: 1 | Status: SHIPPED | OUTPATIENT
Start: 2023-02-06

## 2023-02-13 ENCOUNTER — PATIENT MESSAGE (OUTPATIENT)
Dept: FAMILY MEDICINE CLINIC | Facility: CLINIC | Age: 53
End: 2023-02-13

## 2023-02-13 NOTE — TELEPHONE ENCOUNTER
From: Gaurav Ellington  To: Kenzie Shook MD  Sent: 2/13/2023 11:37 AM CST  Subject: Blood in stool    Hi Dr. Rosario Ramachandran had some blood on the toilet paper when I wipe after a bowel movement the past 5 or 6 days. It's bright red with a tiny bit of mucus. Only appears after my first wipe, no additional blood after subsequent wipes. I've examined my stool and no blood present (that I can see). Seems to be originating in my anus. I have no pain, no stomach issues, no constipation, no diarrhea, stool appears normal. Perhaps the slightest sting this morning when I had a bowel movement but barely noticeable and not unlike a normal bowel movement for me.

## 2023-02-13 NOTE — TELEPHONE ENCOUNTER
Call to pt's cell. Reaches identified VM. Per HIPAA consent, LVM requesting call back to triage nurse tomorrow to schedule appt. Provided call back number and ofc phone hours.

## 2023-02-13 NOTE — TELEPHONE ENCOUNTER
Received call from [facility]  She has noticed blood on toilet paper after having BM  Started 5-6 days ago. No visible blood IN the stool  This has not occurred in the past  \"Tiny\" amount of bright red blood with mucus with each BM  LBM was today  Typically has 1 BM daily, soft formed stool. No straining   No constipation, no diarrhea   Denies abdominal pain, nausea, vomitting, fever, weakness, light headedness     No history of hemorrhoids, other than with pregnancy. She is in menopause. LMP Aug 6954  She is certain blood is coming from rectum    Had colonoscopy in 2021  Findings: 6 mm size sessile polyp observed in the ascending colon. Polypectomy performed using a cold snare. Polyp retrieved, histology pending. Two passes made to evaluate the right colon. Grade I Internal hemorrhoids.   Repeat in 7 years    Leaving [facility] on Thursday, returns on Monday

## 2023-02-15 ENCOUNTER — OFFICE VISIT (OUTPATIENT)
Dept: FAMILY MEDICINE CLINIC | Facility: CLINIC | Age: 53
End: 2023-02-15
Payer: COMMERCIAL

## 2023-02-15 VITALS
RESPIRATION RATE: 18 BRPM | HEART RATE: 70 BPM | SYSTOLIC BLOOD PRESSURE: 98 MMHG | TEMPERATURE: 98 F | HEIGHT: 68 IN | DIASTOLIC BLOOD PRESSURE: 68 MMHG | BODY MASS INDEX: 21.22 KG/M2 | WEIGHT: 140 LBS

## 2023-02-15 DIAGNOSIS — R58 BLOOD ON TOILET PAPER: Primary | ICD-10-CM

## 2023-02-15 DIAGNOSIS — K64.8 HEMORRHOIDS, INTERNAL: ICD-10-CM

## 2023-02-15 RX ORDER — HYDROCORTISONE ACETATE 25 MG/1
25 SUPPOSITORY RECTAL NIGHTLY
Qty: 7 SUPPOSITORY | Refills: 1 | Status: SHIPPED | OUTPATIENT
Start: 2023-02-15

## 2023-02-15 NOTE — PATIENT INSTRUCTIONS
Apply suppositories at bedtime for 7 nights. Monitor symptoms. Keep good hydration. If you will need to start another round of suppositories call office to let us know. If your symptoms would worsen call office to let us know.

## 2023-04-11 ENCOUNTER — OFFICE VISIT (OUTPATIENT)
Facility: LOCATION | Age: 53
End: 2023-04-11
Payer: COMMERCIAL

## 2023-04-11 VITALS — HEART RATE: 68 BPM | TEMPERATURE: 98 F

## 2023-04-11 DIAGNOSIS — K64.2 PROLAPSED INTERNAL HEMORRHOIDS, GRADE 3: Primary | ICD-10-CM

## 2023-04-11 DIAGNOSIS — K92.2 INTESTINAL BLEEDING: ICD-10-CM

## 2023-04-11 DIAGNOSIS — N81.6 RECTOCELE: ICD-10-CM

## 2023-04-12 PROBLEM — K64.2 PROLAPSED INTERNAL HEMORRHOIDS, GRADE 3: Status: ACTIVE | Noted: 2023-04-12

## 2023-04-12 PROBLEM — K92.2 INTESTINAL BLEEDING: Status: ACTIVE | Noted: 2023-04-12

## 2023-04-12 PROBLEM — N81.6 RECTOCELE: Status: ACTIVE | Noted: 2023-04-12

## 2023-05-01 ENCOUNTER — TELEPHONE (OUTPATIENT)
Dept: PHYSICAL THERAPY | Facility: HOSPITAL | Age: 53
End: 2023-05-01

## 2023-05-01 ENCOUNTER — OFFICE VISIT (OUTPATIENT)
Dept: PHYSICAL MEDICINE AND REHAB | Facility: CLINIC | Age: 53
End: 2023-05-01
Payer: COMMERCIAL

## 2023-05-01 VITALS — BODY MASS INDEX: 20.46 KG/M2 | HEIGHT: 68 IN | WEIGHT: 135 LBS

## 2023-05-01 DIAGNOSIS — M75.81 TENDINITIS OF RIGHT ROTATOR CUFF: Primary | ICD-10-CM

## 2023-05-01 PROCEDURE — 99204 OFFICE O/P NEW MOD 45 MIN: CPT | Performed by: PHYSICAL MEDICINE & REHABILITATION

## 2023-05-01 PROCEDURE — 3008F BODY MASS INDEX DOCD: CPT | Performed by: PHYSICAL MEDICINE & REHABILITATION

## 2023-05-01 RX ORDER — MELOXICAM 15 MG/1
15 TABLET ORAL DAILY
Qty: 30 TABLET | Refills: 0 | Status: SHIPPED | OUTPATIENT
Start: 2023-05-01

## 2023-05-23 ENCOUNTER — OFFICE VISIT (OUTPATIENT)
Facility: LOCATION | Age: 53
End: 2023-05-23
Payer: COMMERCIAL

## 2023-05-23 VITALS — HEART RATE: 73 BPM | TEMPERATURE: 98 F

## 2023-05-23 DIAGNOSIS — K64.2 PROLAPSED INTERNAL HEMORRHOIDS, GRADE 3: Primary | ICD-10-CM

## 2023-05-23 PROCEDURE — 46221 LIGATION OF HEMORRHOID(S): CPT | Performed by: COLON & RECTAL SURGERY

## 2023-05-24 NOTE — PATIENT INSTRUCTIONS
At today's office visit we treated right esther-lateral internal hemorrhoid. At today's visit, the patient underwent an uncomplicated application of a rubber band and injection of a 5% phenol solution into the base of the rubberbanded hemorrhoid. The patient tolerated the procedure well. The patient remained stable throughout the entire procedure within my office. The patient was asked to recover in my office for a few minutes prior to leaving for home. The patient should refrain from extreme sports or athletic activity, including heavy lifting. We will see this patient again in 2-3 weeks for further care and treatment.

## 2023-05-24 NOTE — PROCEDURES
Pre op diagnosis: Internal Hemorrhoids    Post op diagnosis: Same    Procedure: Anoscopy with O-ring Rubber Band Ligation of Internal Hemorrhoids    Surgeon: Todd Osborne MD    History of present illness: This patient has internal hemorrhoids that are symptomatic    Operative findings: The internal hemorrhoid was successfully ligated in the standard fashion with an O-ring ligator. Operative summary:  The patient was draped and exposed in the usual fashion. A medical assistant was present at all times. External visualization of the perineum and gluteal cleft was performed. Digital exam was performed with a well lubricated examining finger. Diagnostic Anoscopy was performed with lubrication. The anal canal was well visualized. An internal hemorrhoid was identified and well visualized. The internal hemorrhoid was grasped well above the dentate line with the grasper. The internal hemorrhoid was pulled within the O-ring ligator. The O-ring ligator was fired without complication. A 5% phenol solution was injected into the hemorrhoid and at its base. The patient tolerated the procedure and was observed in our office for at least 5 minutes prior to discharge. All findings are listed in the physical exam section of this note.

## 2023-06-06 ENCOUNTER — OFFICE VISIT (OUTPATIENT)
Facility: LOCATION | Age: 53
End: 2023-06-06
Payer: COMMERCIAL

## 2023-06-06 VITALS — TEMPERATURE: 98 F | HEART RATE: 73 BPM

## 2023-06-06 DIAGNOSIS — K64.2 PROLAPSED INTERNAL HEMORRHOIDS, GRADE 3: Primary | ICD-10-CM

## 2023-06-06 PROCEDURE — 46221 LIGATION OF HEMORRHOID(S): CPT | Performed by: COLON & RECTAL SURGERY

## 2023-06-06 NOTE — LETTER
04/01/22    Our office has been trying to contact you to discuss your recent test results. It is important that we reach you to discuss these results. Please contact our office at your earliest convenience. Thank you for your prompt attention to this matter. You may reach our office at [phone].      Sincerely,      [facility]  [address] , [facility] Darion Perry, [address] [place] [address] PAST MEDICAL HISTORY:  Acute on chronic congestive heart failure, unspecified congestive heart failure type     Cerebrovascular accident (CVA)     DM (diabetes mellitus)     ESRD (end stage renal disease)     High cholesterol     HTN (hypertension)     Myocardial infarction (Coronary angiogram 2014)

## 2023-08-04 ENCOUNTER — LAB ENCOUNTER (OUTPATIENT)
Dept: LAB | Age: 53
End: 2023-08-04
Attending: FAMILY MEDICINE
Payer: COMMERCIAL

## 2023-08-04 DIAGNOSIS — E78.00 HYPERCHOLESTEREMIA: ICD-10-CM

## 2023-08-04 LAB
ALBUMIN SERPL-MCNC: 3.6 G/DL (ref 3.4–5)
ALBUMIN/GLOB SERPL: 1 {RATIO} (ref 1–2)
ALP LIVER SERPL-CCNC: 66 U/L
ALT SERPL-CCNC: 30 U/L
ANION GAP SERPL CALC-SCNC: 5 MMOL/L (ref 0–18)
AST SERPL-CCNC: 15 U/L (ref 15–37)
BILIRUB SERPL-MCNC: 0.7 MG/DL (ref 0.1–2)
BUN BLD-MCNC: 13 MG/DL (ref 7–18)
CALCIUM BLD-MCNC: 9.3 MG/DL (ref 8.5–10.1)
CHLORIDE SERPL-SCNC: 108 MMOL/L (ref 98–112)
CHOLEST SERPL-MCNC: 177 MG/DL (ref ?–200)
CO2 SERPL-SCNC: 27 MMOL/L (ref 21–32)
CREAT BLD-MCNC: 0.94 MG/DL
EGFRCR SERPLBLD CKD-EPI 2021: 73 ML/MIN/1.73M2 (ref 60–?)
FASTING PATIENT LIPID ANSWER: YES
FASTING STATUS PATIENT QL REPORTED: YES
GLOBULIN PLAS-MCNC: 3.5 G/DL (ref 2.8–4.4)
GLUCOSE BLD-MCNC: 90 MG/DL (ref 70–99)
HDLC SERPL-MCNC: 106 MG/DL (ref 40–59)
LDLC SERPL CALC-MCNC: 59 MG/DL (ref ?–100)
NONHDLC SERPL-MCNC: 71 MG/DL (ref ?–130)
OSMOLALITY SERPL CALC.SUM OF ELEC: 290 MOSM/KG (ref 275–295)
POTASSIUM SERPL-SCNC: 3.9 MMOL/L (ref 3.5–5.1)
PROT SERPL-MCNC: 7.1 G/DL (ref 6.4–8.2)
SODIUM SERPL-SCNC: 140 MMOL/L (ref 136–145)
TRIGL SERPL-MCNC: 59 MG/DL (ref 30–149)
VLDLC SERPL CALC-MCNC: 9 MG/DL (ref 0–30)

## 2023-08-04 PROCEDURE — 80061 LIPID PANEL: CPT

## 2023-08-04 PROCEDURE — 80053 COMPREHEN METABOLIC PANEL: CPT

## 2023-09-12 ENCOUNTER — ORDER TRANSCRIPTION (OUTPATIENT)
Dept: ADMINISTRATIVE | Facility: HOSPITAL | Age: 53
End: 2023-09-12

## 2023-09-12 DIAGNOSIS — Z13.6 SCREENING FOR CARDIOVASCULAR CONDITION: Primary | ICD-10-CM

## 2023-09-18 ENCOUNTER — HOSPITAL ENCOUNTER (OUTPATIENT)
Dept: CT IMAGING | Facility: HOSPITAL | Age: 53
End: 2023-09-18
Attending: FAMILY MEDICINE

## 2023-09-18 DIAGNOSIS — Z13.6 SCREENING FOR CARDIOVASCULAR CONDITION: ICD-10-CM

## 2023-11-06 DIAGNOSIS — E78.00 HYPERCHOLESTEREMIA: Primary | ICD-10-CM

## 2023-11-07 RX ORDER — METHYLPHENIDATE HYDROCHLORIDE 10 MG/1
10 TABLET ORAL 2 TIMES DAILY
Refills: 0 | OUTPATIENT
Start: 2023-11-07

## 2023-11-07 RX ORDER — ATORVASTATIN CALCIUM 10 MG/1
10 TABLET, FILM COATED ORAL DAILY
Qty: 30 TABLET | Refills: 0 | Status: SHIPPED | OUTPATIENT
Start: 2023-11-07

## 2023-11-07 NOTE — TELEPHONE ENCOUNTER
LOV: 02/06/2023  for: Medication Follow-Up   Patient advised to RTC on:   6 months for physical   Nov:11/21/2023         Medication Quantity Refills Start End   atorvastatin 10 MG Oral Tab 90 tablet 1 2/6/2023    Sig:   Take 1 tablet (10 mg total) by mouth daily.

## 2023-11-21 ENCOUNTER — OFFICE VISIT (OUTPATIENT)
Dept: FAMILY MEDICINE CLINIC | Facility: CLINIC | Age: 53
End: 2023-11-21
Payer: COMMERCIAL

## 2023-11-21 ENCOUNTER — LAB ENCOUNTER (OUTPATIENT)
Dept: LAB | Age: 53
End: 2023-11-21
Attending: FAMILY MEDICINE
Payer: COMMERCIAL

## 2023-11-21 VITALS
TEMPERATURE: 98 F | HEART RATE: 62 BPM | BODY MASS INDEX: 21.82 KG/M2 | SYSTOLIC BLOOD PRESSURE: 90 MMHG | HEIGHT: 68 IN | RESPIRATION RATE: 18 BRPM | DIASTOLIC BLOOD PRESSURE: 60 MMHG | WEIGHT: 144 LBS

## 2023-11-21 DIAGNOSIS — Z13.89 SCREENING FOR GENITOURINARY CONDITION: ICD-10-CM

## 2023-11-21 DIAGNOSIS — Z00.00 PHYSICAL EXAM, ANNUAL: Primary | ICD-10-CM

## 2023-11-21 DIAGNOSIS — E55.9 VITAMIN D DEFICIENCY: ICD-10-CM

## 2023-11-21 DIAGNOSIS — Z00.00 LABORATORY EXAMINATION ORDERED AS PART OF A ROUTINE GENERAL MEDICAL EXAMINATION: ICD-10-CM

## 2023-11-21 DIAGNOSIS — Z00.00 PHYSICAL EXAM, ANNUAL: ICD-10-CM

## 2023-11-21 LAB
ALBUMIN SERPL-MCNC: 4 G/DL (ref 3.4–5)
ALBUMIN/GLOB SERPL: 1.1 {RATIO} (ref 1–2)
ALP LIVER SERPL-CCNC: 67 U/L
ALT SERPL-CCNC: 23 U/L
ANION GAP SERPL CALC-SCNC: 8 MMOL/L (ref 0–18)
AST SERPL-CCNC: 26 U/L (ref 15–37)
BASOPHILS # BLD AUTO: 0.1 X10(3) UL (ref 0–0.2)
BASOPHILS NFR BLD AUTO: 1.2 %
BILIRUB SERPL-MCNC: 0.6 MG/DL (ref 0.1–2)
BILIRUB UR QL STRIP.AUTO: NEGATIVE
BUN BLD-MCNC: 11 MG/DL (ref 9–23)
CALCIUM BLD-MCNC: 10.3 MG/DL (ref 8.5–10.1)
CHLORIDE SERPL-SCNC: 105 MMOL/L (ref 98–112)
CLARITY UR REFRACT.AUTO: CLEAR
CO2 SERPL-SCNC: 26 MMOL/L (ref 21–32)
CREAT BLD-MCNC: 0.86 MG/DL
EGFRCR SERPLBLD CKD-EPI 2021: 81 ML/MIN/1.73M2 (ref 60–?)
EOSINOPHIL # BLD AUTO: 0.48 X10(3) UL (ref 0–0.7)
EOSINOPHIL NFR BLD AUTO: 6 %
ERYTHROCYTE [DISTWIDTH] IN BLOOD BY AUTOMATED COUNT: 12.5 %
FASTING STATUS PATIENT QL REPORTED: YES
GLOBULIN PLAS-MCNC: 3.8 G/DL (ref 2.8–4.4)
GLUCOSE BLD-MCNC: 90 MG/DL (ref 70–99)
GLUCOSE UR STRIP.AUTO-MCNC: NORMAL MG/DL
HCT VFR BLD AUTO: 36.7 %
HGB BLD-MCNC: 12.8 G/DL
IMM GRANULOCYTES # BLD AUTO: 0.02 X10(3) UL (ref 0–1)
IMM GRANULOCYTES NFR BLD: 0.2 %
KETONES UR STRIP.AUTO-MCNC: NEGATIVE MG/DL
LEUKOCYTE ESTERASE UR QL STRIP.AUTO: NEGATIVE
LYMPHOCYTES # BLD AUTO: 2.93 X10(3) UL (ref 1–4)
LYMPHOCYTES NFR BLD AUTO: 36.5 %
MCH RBC QN AUTO: 31.8 PG (ref 26–34)
MCHC RBC AUTO-ENTMCNC: 34.9 G/DL (ref 31–37)
MCV RBC AUTO: 91.1 FL
MONOCYTES # BLD AUTO: 0.72 X10(3) UL (ref 0.1–1)
MONOCYTES NFR BLD AUTO: 9 %
NEUTROPHILS # BLD AUTO: 3.77 X10 (3) UL (ref 1.5–7.7)
NEUTROPHILS # BLD AUTO: 3.77 X10(3) UL (ref 1.5–7.7)
NEUTROPHILS NFR BLD AUTO: 47.1 %
NITRITE UR QL STRIP.AUTO: NEGATIVE
OSMOLALITY SERPL CALC.SUM OF ELEC: 287 MOSM/KG (ref 275–295)
PH UR STRIP.AUTO: 5 [PH] (ref 5–8)
PLATELET # BLD AUTO: 327 10(3)UL (ref 150–450)
POTASSIUM SERPL-SCNC: 4 MMOL/L (ref 3.5–5.1)
PROT SERPL-MCNC: 7.8 G/DL (ref 6.4–8.2)
PROT UR STRIP.AUTO-MCNC: NEGATIVE MG/DL
RBC # BLD AUTO: 4.03 X10(6)UL
RBC UR QL AUTO: NEGATIVE
SODIUM SERPL-SCNC: 139 MMOL/L (ref 136–145)
SP GR UR STRIP.AUTO: 1.02 (ref 1–1.03)
TSI SER-ACNC: 0.93 MIU/ML (ref 0.36–3.74)
UROBILINOGEN UR STRIP.AUTO-MCNC: NORMAL MG/DL
VIT D+METAB SERPL-MCNC: 28.7 NG/ML (ref 30–100)
WBC # BLD AUTO: 8 X10(3) UL (ref 4–11)

## 2023-11-21 PROCEDURE — 99396 PREV VISIT EST AGE 40-64: CPT | Performed by: FAMILY MEDICINE

## 2023-11-21 PROCEDURE — 81003 URINALYSIS AUTO W/O SCOPE: CPT

## 2023-11-21 PROCEDURE — 85025 COMPLETE CBC W/AUTO DIFF WBC: CPT

## 2023-11-21 PROCEDURE — 82306 VITAMIN D 25 HYDROXY: CPT

## 2023-11-21 PROCEDURE — 3074F SYST BP LT 130 MM HG: CPT | Performed by: FAMILY MEDICINE

## 2023-11-21 PROCEDURE — 80053 COMPREHEN METABOLIC PANEL: CPT

## 2023-11-21 PROCEDURE — 3008F BODY MASS INDEX DOCD: CPT | Performed by: FAMILY MEDICINE

## 2023-11-21 PROCEDURE — 84443 ASSAY THYROID STIM HORMONE: CPT

## 2023-11-21 PROCEDURE — 3078F DIAST BP <80 MM HG: CPT | Performed by: FAMILY MEDICINE

## 2023-11-21 RX ORDER — ERGOCALCIFEROL 1.25 MG/1
50000 CAPSULE ORAL WEEKLY
Qty: 12 CAPSULE | Refills: 0 | Status: SHIPPED | OUTPATIENT
Start: 2023-11-21 | End: 2024-02-19

## 2023-12-24 DIAGNOSIS — E78.00 HYPERCHOLESTEREMIA: ICD-10-CM

## 2023-12-26 RX ORDER — ATORVASTATIN CALCIUM 10 MG/1
10 TABLET, FILM COATED ORAL DAILY
Qty: 30 TABLET | Refills: 0 | Status: SHIPPED | OUTPATIENT
Start: 2023-12-26

## 2024-01-07 ENCOUNTER — PATIENT MESSAGE (OUTPATIENT)
Dept: FAMILY MEDICINE CLINIC | Facility: CLINIC | Age: 54
End: 2024-01-07

## 2024-01-07 DIAGNOSIS — E78.00 HYPERCHOLESTEREMIA: ICD-10-CM

## 2024-01-08 RX ORDER — ATORVASTATIN CALCIUM 10 MG/1
10 TABLET, FILM COATED ORAL DAILY
Qty: 90 TABLET | Refills: 0 | Status: SHIPPED | OUTPATIENT
Start: 2024-01-08

## 2024-01-08 NOTE — TELEPHONE ENCOUNTER
From: Donna Bee  To: Clemencia Garcia  Sent: 1/7/2024 11:50 AM CST  Subject: atorvastatin 10 MG Tabs - 90 day supply approval needed    I'd like to get a 90 day supply and my insurance company is also holding up my refills requesting the same. Can you please approve a 90 day supply with my pharmacy going forward? My pharmacy information is [facility] and correct in your system.    Thank you!

## 2024-02-20 ENCOUNTER — OFFICE VISIT (OUTPATIENT)
Dept: FAMILY MEDICINE CLINIC | Facility: CLINIC | Age: 54
End: 2024-02-20
Payer: COMMERCIAL

## 2024-02-20 VITALS
SYSTOLIC BLOOD PRESSURE: 104 MMHG | WEIGHT: 144 LBS | HEIGHT: 68 IN | DIASTOLIC BLOOD PRESSURE: 77 MMHG | RESPIRATION RATE: 12 BRPM | HEART RATE: 60 BPM | BODY MASS INDEX: 21.82 KG/M2 | TEMPERATURE: 97 F

## 2024-02-20 DIAGNOSIS — E55.9 VITAMIN D DEFICIENCY: ICD-10-CM

## 2024-02-20 DIAGNOSIS — R19.5 LOOSE STOOLS: Primary | ICD-10-CM

## 2024-02-20 DIAGNOSIS — M54.2 NECK PAIN: ICD-10-CM

## 2024-02-20 DIAGNOSIS — E78.00 HYPERCHOLESTEREMIA: ICD-10-CM

## 2024-02-20 DIAGNOSIS — R93.1 ABNORMAL HEART SCORE CT: ICD-10-CM

## 2024-02-20 PROCEDURE — 99214 OFFICE O/P EST MOD 30 MIN: CPT | Performed by: FAMILY MEDICINE

## 2024-02-20 RX ORDER — METHYLPHENIDATE HYDROCHLORIDE 20 MG/1
20 CAPSULE, EXTENDED RELEASE ORAL EVERY MORNING
COMMUNITY
Start: 2024-01-11

## 2024-02-20 NOTE — PROGRESS NOTES
Donna Bee is a 53 year old female.  Loose stool, hypercholesterolemia, vitamin D deficiency, neck pain,  HPI:   Patient complains of having loose stools going on for a while but recently worse.  From morning until noon she goes to the bathroom frequently.  Some does get some bleeding she is internal hemorrhoids without bleeding she had 2 times banding done of the hemorrhoids but she needs more work.  Couple times she had some abdominal discomfort or pain but she was on vacation.  She try probiotic offered by her friend but it did not quite work.  She was trying to avoid lactase with some improvement but not all the way.  She was thinking it is related to statin but she stopped medication and it did not make a difference.  Will refer patient to GI doctor for evaluation.    Patient complains of having neck pain that is chronic.  Usually stretching helps.  Recently noted that she has some numbing sensation in the shoulders bilaterally.  Does not want to do the physical therapy for that yet.  She will try to do some exercises at home and some compresses.    Hypercholesterolemia patient is taking atorvastatin 10 mg daily she had abnormal heart scan score continue medication.  Asymptomatic.    Vitamin D deficiency she likes the vitamin D supplement it helps her energy.  She will check the blood work done if needed we will give patient more medication.    Current Outpatient Medications   Medication Sig Dispense Refill   • Methylphenidate HCl ER, CD, 20 MG Oral Cap CR Take 1 capsule (20 mg total) by mouth every morning.     • atorvastatin 10 MG Oral Tab Take 1 tablet (10 mg total) by mouth daily. 90 tablet 0   • Meloxicam 15 MG Oral Tab Take 1 tablet (15 mg total) by mouth daily. 30 tablet 0   • methylphenidate 10 MG Oral Tab Take 1 tablet (10 mg total) by mouth 2 (two) times daily.     • Cetirizine HCl 10 MG Oral Cap Take  by mouth.        Past Medical History:   Diagnosis Date   • Back pain 2004    mild, occasional    • Depression    • Food intolerance 2006/2007    Milk intolerance during 2nd pregnancy and beyond   • High cholesterol 1995   • Night sweats 2019    menopause   • Stress 2020    Divorce/Custody dispute   • Wears glasses 1982      Social History:  Social History     Socioeconomic History   • Marital status: Single   Tobacco Use   • Smoking status: Never   • Smokeless tobacco: Never   Vaping Use   • Vaping Use: Never used   Substance and Sexual Activity   • Alcohol use: Yes     Alcohol/week: 1.0 standard drink of alcohol     Types: 1 Glasses of wine per week   • Drug use: No   Other Topics Concern   • Caffeine Concern No     Comment: 1 coffee q day   • Exercise Yes     Comment: tennis 2 x q week        REVIEW OF SYSTEMS:   GENERAL HEALTH: feels well otherwise  SKIN: denies any unusual skin lesions or rashes  HEENT no congestion no runny nose no sore throat  Neck no neck pain  RESPIRATORY: denies shortness of breath with exertion  CARDIOVASCULAR: denies chest pain on exertion  GI: denies abdominal pain and denies heartburn  NEURO: denies headaches  Psych normal mood.    EXAM:   /77 (BP Location: Left arm, Patient Position: Sitting, Cuff Size: adult)   Pulse 60   Temp 97.2 °F (36.2 °C) (Temporal)   Resp 12   Ht 5' 8\" (1.727 m)   Wt 144 lb (65.3 kg)   LMP 08/21/2021 (Exact Date)   BMI 21.90 kg/m²   GENERAL: well developed, well nourished,in no apparent distress  SKIN: no rashes,no suspicious lesions  HEENT: atraumatic, normocephalic,ears and throat are clear  NECK: supple,no adenopathy  LUNGS: clear to auscultation  CARDIO: RRR without murmur  GI: good BS's,no masses, HSM or tenderness  EXTREMITIES: no edema  Psychiatric - alert  and oriented x3, normal mood     ASSESSMENT AND PLAN:     Encounter Diagnoses   Name Primary?   • Loose stools Yes   • Hypercholesteremia    • Vitamin D deficiency    • Abnormal Heart Score CT    • Neck pain        Orders Placed This Encounter   Procedures   • Lipid Panel   • Comp  Metabolic Panel (14)   • Vitamin D   • CBC W Differential W Platelet [E]       Meds & Refills for this Visit:  Requested Prescriptions      No prescriptions requested or ordered in this encounter     Lactaid pills Tried with lactose-containing products.  See GI doctor for evaluation.  Continue atorvastatin.  Do fasting blood work.  Try Biofreeze to your neck and warm compresses as we discussed in office.    Imaging & Consults:  GASTRO - INTERNAL    The patient indicates understanding of these issues and agrees to the plan.  The patient is asked to return in TBD after labs and improvement.  Med check in 6 months.  The note was dictated using speech recognition software.  Accuracy and grammar in transcription may be subject to error.

## 2024-02-20 NOTE — PATIENT INSTRUCTIONS
Lactaid pills Tried with lactose-containing products.  See GI doctor for evaluation.  Continue atorvastatin.  Do fasting blood work.  Try Biofreeze to your neck and warm compresses as we discussed in office.

## 2024-02-26 RX ORDER — ERGOCALCIFEROL 1.25 MG/1
50000 CAPSULE ORAL WEEKLY
Qty: 12 CAPSULE | Refills: 0 | OUTPATIENT
Start: 2024-02-26

## 2024-02-26 NOTE — TELEPHONE ENCOUNTER
LOV: 2023  for: CPX  Patient advised to RTC on:  CPX in 1 year.      Medication Quantity Refills Start End   ergocalciferol 1.25 MG (12539 UT) Oral Cap () 12 capsule 0 2023   Sig:   Take 1 capsule (50,000 Units total) by mouth once a week.

## 2024-03-05 ENCOUNTER — LABORATORY ENCOUNTER (OUTPATIENT)
Dept: LAB | Age: 54
End: 2024-03-05
Attending: FAMILY MEDICINE
Payer: COMMERCIAL

## 2024-03-05 DIAGNOSIS — R19.5 LOOSE STOOLS: ICD-10-CM

## 2024-03-05 DIAGNOSIS — E55.9 VITAMIN D DEFICIENCY: ICD-10-CM

## 2024-03-05 DIAGNOSIS — E78.00 HYPERCHOLESTEREMIA: ICD-10-CM

## 2024-03-05 LAB
BASOPHILS # BLD AUTO: 0.09 X10(3) UL (ref 0–0.2)
BASOPHILS NFR BLD AUTO: 1.6 %
EOSINOPHIL # BLD AUTO: 0.4 X10(3) UL (ref 0–0.7)
EOSINOPHIL NFR BLD AUTO: 7 %
ERYTHROCYTE [DISTWIDTH] IN BLOOD BY AUTOMATED COUNT: 12.6 %
HCT VFR BLD AUTO: 38.3 %
HGB BLD-MCNC: 12.7 G/DL
IMM GRANULOCYTES # BLD AUTO: 0.01 X10(3) UL (ref 0–1)
IMM GRANULOCYTES NFR BLD: 0.2 %
LYMPHOCYTES # BLD AUTO: 2.33 X10(3) UL (ref 1–4)
LYMPHOCYTES NFR BLD AUTO: 41 %
MCH RBC QN AUTO: 31.1 PG (ref 26–34)
MCHC RBC AUTO-ENTMCNC: 33.2 G/DL (ref 31–37)
MCV RBC AUTO: 93.6 FL
MONOCYTES # BLD AUTO: 0.59 X10(3) UL (ref 0.1–1)
MONOCYTES NFR BLD AUTO: 10.4 %
NEUTROPHILS # BLD AUTO: 2.26 X10 (3) UL (ref 1.5–7.7)
NEUTROPHILS # BLD AUTO: 2.26 X10(3) UL (ref 1.5–7.7)
NEUTROPHILS NFR BLD AUTO: 39.8 %
PLATELET # BLD AUTO: 358 10(3)UL (ref 150–450)
RBC # BLD AUTO: 4.09 X10(6)UL
WBC # BLD AUTO: 5.7 X10(3) UL (ref 4–11)

## 2024-03-05 PROCEDURE — 85025 COMPLETE CBC W/AUTO DIFF WBC: CPT

## 2024-03-05 PROCEDURE — 36415 COLL VENOUS BLD VENIPUNCTURE: CPT

## 2024-03-05 PROCEDURE — 80053 COMPREHEN METABOLIC PANEL: CPT

## 2024-03-05 PROCEDURE — 82306 VITAMIN D 25 HYDROXY: CPT

## 2024-03-05 PROCEDURE — 80061 LIPID PANEL: CPT

## 2024-03-06 ENCOUNTER — PATIENT MESSAGE (OUTPATIENT)
Dept: FAMILY MEDICINE CLINIC | Facility: CLINIC | Age: 54
End: 2024-03-06

## 2024-03-06 DIAGNOSIS — E78.00 HYPERCHOLESTEREMIA: Primary | ICD-10-CM

## 2024-03-06 LAB
ALBUMIN SERPL-MCNC: 4 G/DL (ref 3.4–5)
ALBUMIN/GLOB SERPL: 1.2 {RATIO} (ref 1–2)
ALP LIVER SERPL-CCNC: 78 U/L
ALT SERPL-CCNC: 18 U/L
ANION GAP SERPL CALC-SCNC: 1 MMOL/L (ref 0–18)
AST SERPL-CCNC: 20 U/L (ref 15–37)
BILIRUB SERPL-MCNC: 0.6 MG/DL (ref 0.1–2)
BUN BLD-MCNC: 14 MG/DL (ref 9–23)
CALCIUM BLD-MCNC: 9.4 MG/DL (ref 8.5–10.1)
CHLORIDE SERPL-SCNC: 108 MMOL/L (ref 98–112)
CHOLEST SERPL-MCNC: 224 MG/DL (ref ?–200)
CO2 SERPL-SCNC: 29 MMOL/L (ref 21–32)
CREAT BLD-MCNC: 0.88 MG/DL
EGFRCR SERPLBLD CKD-EPI 2021: 79 ML/MIN/1.73M2 (ref 60–?)
FASTING PATIENT LIPID ANSWER: YES
FASTING STATUS PATIENT QL REPORTED: YES
GLOBULIN PLAS-MCNC: 3.3 G/DL (ref 2.8–4.4)
GLUCOSE BLD-MCNC: 86 MG/DL (ref 70–99)
HDLC SERPL-MCNC: 106 MG/DL (ref 40–59)
LDLC SERPL CALC-MCNC: 110 MG/DL (ref ?–100)
NONHDLC SERPL-MCNC: 118 MG/DL (ref ?–130)
OSMOLALITY SERPL CALC.SUM OF ELEC: 286 MOSM/KG (ref 275–295)
POTASSIUM SERPL-SCNC: 4.3 MMOL/L (ref 3.5–5.1)
PROT SERPL-MCNC: 7.3 G/DL (ref 6.4–8.2)
SODIUM SERPL-SCNC: 138 MMOL/L (ref 136–145)
TRIGL SERPL-MCNC: 48 MG/DL (ref 30–149)
VIT D+METAB SERPL-MCNC: 55.3 NG/ML (ref 30–100)
VLDLC SERPL CALC-MCNC: 8 MG/DL (ref 0–30)

## 2024-03-07 NOTE — TELEPHONE ENCOUNTER
From: Donna Bee  To: Clemencia Garcia  Sent: 3/6/2024 7:25 PM CST  Subject: Lipid panel     I know my cholesterol levels came back high, but I ran out of my statin and hadn’t taken it for about a week before the draw. It was refilled a few days ago. Thanks

## 2024-03-07 NOTE — TELEPHONE ENCOUNTER
From: Donna Bee  To: Clemencia Garcia  Sent: 3/6/2024 7:33 PM CST  Subject: Vitamin D    Vitamin D levels appear normal after stopping 50kmg/wk + 1000mg daily for 2+ weeks. I would like to resume as it decreases my fatigue. Please advise on daily or weekly supplement options. I have a bottle of D3 50 MCG (2000 IU). The script was expensive so prefer OTC if possible.   Thanks!

## 2024-05-29 DIAGNOSIS — E78.00 HYPERCHOLESTEREMIA: ICD-10-CM

## 2024-05-30 ENCOUNTER — MED REC SCAN ONLY (OUTPATIENT)
Dept: FAMILY MEDICINE CLINIC | Facility: CLINIC | Age: 54
End: 2024-05-30

## 2024-06-01 RX ORDER — ATORVASTATIN CALCIUM 10 MG/1
10 TABLET, FILM COATED ORAL DAILY
Qty: 90 TABLET | Refills: 0 | Status: SHIPPED | OUTPATIENT
Start: 2024-06-01

## 2024-06-01 NOTE — TELEPHONE ENCOUNTER
LOV: 2/20/2024  for: Hypercholesterolemia, vitamin D deficiency, neck pain  Patient advised to RTC on:  6 months.     Medication Quantity Refills Start End   atorvastatin 10 MG Oral Tab 90 tablet 0 1/8/2024 --   Sig:   Take 1 tablet (10 mg total) by mouth daily.

## 2024-07-29 ENCOUNTER — MED REC SCAN ONLY (OUTPATIENT)
Dept: FAMILY MEDICINE CLINIC | Facility: CLINIC | Age: 54
End: 2024-07-29

## 2024-07-30 ENCOUNTER — LAB ENCOUNTER (OUTPATIENT)
Dept: LAB | Age: 54
End: 2024-07-30
Payer: COMMERCIAL

## 2024-07-30 DIAGNOSIS — R19.5 LOOSE STOOLS: ICD-10-CM

## 2024-07-30 LAB
CRYPTOSP AG STL QL IA: NEGATIVE
G LAMBLIA AG STL QL IA: NEGATIVE

## 2024-07-30 PROCEDURE — 87272 CRYPTOSPORIDIUM AG IF: CPT

## 2024-07-30 PROCEDURE — 87493 C DIFF AMPLIFIED PROBE: CPT

## 2024-07-30 PROCEDURE — 87329 GIARDIA AG IA: CPT

## 2024-07-30 PROCEDURE — 89055 LEUKOCYTE ASSESSMENT FECAL: CPT

## 2024-07-30 PROCEDURE — 82705 FATS/LIPIDS FECES QUAL: CPT

## 2024-07-30 PROCEDURE — 83993 ASSAY FOR CALPROTECTIN FECAL: CPT

## 2024-07-30 PROCEDURE — 82656 EL-1 FECAL QUAL/SEMIQ: CPT

## 2024-07-31 LAB — C DIFF TOX B STL QL: NEGATIVE

## 2024-08-01 LAB — CALPROTECTIN STL-MCNT: 253 ΜG/G (ref ?–50)

## 2024-08-03 LAB — PANCREATIC ELAST FECAL: >800 UG ELAST./G

## 2024-08-05 NOTE — PROGRESS NOTES
Results were reviewed and discussed with the patient over the phone. Labs revealed normal fecal pancreatic elastase and elevated fecal calprotectin. Recommend colonoscopy ASAP.    Please schedule

## 2024-08-07 LAB
FATS NEUTRAL: NORMAL
FATS TOTAL: NORMAL

## 2024-08-19 ENCOUNTER — TELEPHONE (OUTPATIENT)
Dept: ORTHOPEDICS CLINIC | Facility: CLINIC | Age: 54
End: 2024-08-19

## 2024-08-19 DIAGNOSIS — M54.50 LOW BACK PAIN, UNSPECIFIED BACK PAIN LATERALITY, UNSPECIFIED CHRONICITY, UNSPECIFIED WHETHER SCIATICA PRESENT: Primary | ICD-10-CM

## 2024-08-19 NOTE — TELEPHONE ENCOUNTER
Patient scheduled on [facility] for lower back pain. Please advise if imaging is needed.  Future Appointments   Date Time Provider Department Center   8/22/2024  2:00 PM Ruben Jauregui MD [facility]

## 2024-08-19 NOTE — TELEPHONE ENCOUNTER
X-Ray ordered and scheduled. Called and left a message for the patient. [employer] s[employer]l message sent.     Future Appointments   Date Time Provider Department Center   8/22/2024  1:30 PM [facility] [place]   8/22/2024  2:00 PM Ruben Jauregui MD [facility]

## 2024-08-22 ENCOUNTER — HOSPITAL ENCOUNTER (OUTPATIENT)
Dept: GENERAL RADIOLOGY | Age: 54
Discharge: HOME | End: 2024-08-22
Attending: STUDENT IN AN ORGANIZED HEALTH CARE EDUCATION/TRAINING PROGRAM
Payer: COMMERCIAL

## 2024-08-22 ENCOUNTER — OFFICE VISIT (OUTPATIENT)
Dept: ORTHOPEDICS CLINIC | Facility: CLINIC | Age: 54
End: 2024-08-22
Payer: COMMERCIAL

## 2024-08-22 VITALS — BODY MASS INDEX: 21.22 KG/M2 | WEIGHT: 140 LBS | HEIGHT: 68 IN

## 2024-08-22 DIAGNOSIS — M54.50 LOW BACK PAIN, UNSPECIFIED BACK PAIN LATERALITY, UNSPECIFIED CHRONICITY, UNSPECIFIED WHETHER SCIATICA PRESENT: ICD-10-CM

## 2024-08-22 DIAGNOSIS — M54.41 ACUTE RIGHT-SIDED LOW BACK PAIN WITH RIGHT-SIDED SCIATICA: Primary | ICD-10-CM

## 2024-08-22 PROCEDURE — 72100 X-RAY EXAM L-S SPINE 2/3 VWS: CPT | Performed by: STUDENT IN AN ORGANIZED HEALTH CARE EDUCATION/TRAINING PROGRAM

## 2024-08-22 PROCEDURE — 3008F BODY MASS INDEX DOCD: CPT | Performed by: STUDENT IN AN ORGANIZED HEALTH CARE EDUCATION/TRAINING PROGRAM

## 2024-08-22 PROCEDURE — 99204 OFFICE O/P NEW MOD 45 MIN: CPT | Performed by: STUDENT IN AN ORGANIZED HEALTH CARE EDUCATION/TRAINING PROGRAM

## 2024-08-22 RX ORDER — METHYLPREDNISOLONE 4 MG
TABLET, DOSE PACK ORAL
Qty: 21 TABLET | Refills: 0 | Status: SHIPPED | OUTPATIENT
Start: 2024-08-22

## 2024-08-23 NOTE — H&P
[facility] - ORTHOPEDICS  [address], [address] [address]  [address] [place] [address]  [phone]     NEW PATIENT VISIT - HISTORY AND PHYSICAL EXAMINATION     Name: Donna Bee   MRN: [id number]  Date: 08/23/24       CC: Low back pain    REFERRED BY: Self    HPI:   Donna Bee is a very pleasant 54 year old female who presents today for evaluation of back pain. The distribution of symptoms are: 100% backpain and 0% leg pain. The symptoms began many year(s) ago without any significant injury. Since the onset, the symptoms have wax and waned over time. Patient has an exacerbation 10-14 days ago. The patient reports no numbness and no weakness.  The symptom characteristics are as follows: sharp, aching.     Prior spine surgery: none.    Bowel and bladder symptoms: absent.    The patient has not had issues with balance and/or hand dexterity problems such as changes in penmanship or the use of buttons or zippers.    Treatment up to this time has included:    Evaluation: PCP  NSAIDS: have worked well  Narcotic use: None  Physical therapy: None  Spinal injections: None  Others:       PMH:   Past Medical History:    Back pain    mild, occasional    Blood in the stool    Depression    Diarrhea, unspecified    occasional, increasing past 6 months    Flatulence/gas pain/belching    Increasing past 6 months    Food intolerance    Milk intolerance during 2nd pregnancy and beyond    Hemorrhoids    High cholesterol    Irregular bowel habits    Night sweats    menopause    Pain in joints    Stress    Divorce/Custody dispute    Wears glasses       PAST SURGICAL HX:  Past Surgical History:   Procedure Laterality Date    Colonoscopy  2021       FAMILY HX:  Family History   Problem Relation Age of Onset    Lipids Father     Heart Disorder Father         CABG    Cancer Mother 35        breast ca     Lipids Mother     Other (TIA) Mother     Other (edgard) Mother     Breast  Cancer Mother         Mastectomy in 1975, no recurrence    Lipids Son     Other (CVA) Maternal Grandmother     Cancer Maternal Grandfather         LUNG CA    Heart Disorder Paternal Grandmother         cabg    Other (emphysema) Paternal Grandfather        ALLERGIES:  Seasonal    MEDICATIONS:   Current Outpatient Medications   Medication Sig Dispense Refill    methylPREDNISolone (MEDROL) 4 MG Oral Tablet Therapy Pack As directed. 21 tablet 0    atorvastatin 10 MG Oral Tab Take 1 tablet (10 mg total) by mouth daily. 90 tablet 0    PEG 3350-KCl-Na Bicarb-NaCl 420 g Oral Recon Soln Take as directed by your doctor 4000 mL 0    Methylphenidate HCl ER, CD, 20 MG Oral Cap CR Take 1 capsule (20 mg total) by mouth every morning.      methylphenidate 10 MG Oral Tab Take 1 tablet (10 mg total) by mouth 2 (two) times daily.      Cetirizine HCl 10 MG Oral Cap Take  by mouth.         ROS: A comprehensive 14 point review of systems was performed and was negative aside from the aforementioned per history of present illness.    SOCIAL HX:  Social History     Tobacco Use    Smoking status: Never    Smokeless tobacco: Never   Substance Use Topics    Alcohol use: Yes     Alcohol/week: 1.0 standard drink of alcohol       PE:   Vitals:    08/22/24 1401   Weight: 140 lb (63.5 kg)   Height: 5' 8\" (1.727 m)     Estimated body mass index is 21.29 kg/m² as calculated from the following:    Height as of this encounter: 5' 8\" (1.727 m).    Weight as of this encounter: 140 lb (63.5 kg).    Physical Exam  Constitutional:       Appearance: Normal appearance.   HENT:      Head: Normocephalic and atraumatic.   Eyes:      Extraocular Movements: Extraocular movements intact.   Cardiovascular:      Pulses: Normal pulses. Skin warm and well perfused.  Pulmonary:      Effort: Pulmonary effort is normal. No respiratory distress.   Skin:     General: Skin is warm.   Psychiatric:         Mood and Affect: Mood normal.     Spine Exam:    Normal gait without  difficulty  Able to heel, toe, tandem gait without difficulty  Level shoulders and hips in even stance    Restricted L-spine ROM    No tenderness to palpation of L-spine    Straight leg raise test: negative    Sustained clonus: negative    LE Strength: 5/5 IP QUAD TA EHL GSC  LE Sensation: normal in L2-S1 distribution  LE reflexes: normal    Radiographic Examination/Diagnostics:  XR personally viewed, independently interpreted and radiology report was reviewed.  X-ray of the lumbar spine and extremities no acute pathology, minimal degenerative changes      IMPRESSION: Donna Bee is a 54 year old female with low back pain    PLAN:     We reviewed the patients history, symptoms, exam findings, and imaging today.  We had a detailed discussion outlining the etiology, anatomy, pathophysiology, and natural history of low back pain. The typical management of this condition may include lifestyle modification, NSAIDs, physical therapy, oral steroids, epidural injections, neuromodulatory medications, and sometimes pain medications.  Based on our discussion today we would like to have the patient initiate our recommendations for continued conservative therapy in the treatment of their condition noted in the assessment section.     - Provided home exercise program  - Referred to Physical Therapy: home exercise program, aerobic exercises, core strengthening and conditioning, possible manipulative therapy,  and modalities as indicated  - Prescribed medrol dose pack      FOLLOW-UP:  We will see her back in follow-up in 6 weeks, or sooner if any problems arise. Patient understands and agrees with plan.      Ruben Jauregui MD  Orthopedic Spine Surgeon  [facility]   [address] [place] [address] [id number] [place] [address].org  [email or web address]  t: [phone]   f: [phone]        This note was dictated using Dragon software.  While it was briefly proofread  prior to completion, some grammatical, spelling, and word choice errors due to dictation may still occur.

## 2024-08-31 DIAGNOSIS — E78.00 HYPERCHOLESTEREMIA: ICD-10-CM

## 2024-09-01 RX ORDER — ATORVASTATIN CALCIUM 10 MG/1
10 TABLET, FILM COATED ORAL DAILY
Qty: 90 TABLET | Refills: 0 | Status: SHIPPED | OUTPATIENT
Start: 2024-09-01

## 2024-10-09 ENCOUNTER — LAB ENCOUNTER (OUTPATIENT)
Dept: LAB | Age: 54
End: 2024-10-09
Attending: FAMILY MEDICINE
Payer: COMMERCIAL

## 2024-10-09 DIAGNOSIS — E78.00 HYPERCHOLESTEREMIA: ICD-10-CM

## 2024-10-09 LAB
ALBUMIN SERPL-MCNC: 4.7 G/DL (ref 3.2–4.8)
ALBUMIN/GLOB SERPL: 1.7 {RATIO} (ref 1–2)
ALP LIVER SERPL-CCNC: 82 U/L
ALT SERPL-CCNC: 16 U/L
ANION GAP SERPL CALC-SCNC: 8 MMOL/L (ref 0–18)
AST SERPL-CCNC: 18 U/L (ref ?–34)
BILIRUB SERPL-MCNC: 0.6 MG/DL (ref 0.3–1.2)
BUN BLD-MCNC: 14 MG/DL (ref 9–23)
CALCIUM BLD-MCNC: 10.2 MG/DL (ref 8.7–10.4)
CHLORIDE SERPL-SCNC: 105 MMOL/L (ref 98–112)
CHOLEST SERPL-MCNC: 207 MG/DL (ref ?–200)
CO2 SERPL-SCNC: 29 MMOL/L (ref 21–32)
CREAT BLD-MCNC: 0.77 MG/DL
EGFRCR SERPLBLD CKD-EPI 2021: 92 ML/MIN/1.73M2 (ref 60–?)
FASTING PATIENT LIPID ANSWER: YES
FASTING STATUS PATIENT QL REPORTED: YES
GLOBULIN PLAS-MCNC: 2.7 G/DL (ref 2–3.5)
GLUCOSE BLD-MCNC: 90 MG/DL (ref 70–99)
HDLC SERPL-MCNC: 101 MG/DL (ref 40–59)
LDLC SERPL CALC-MCNC: 97 MG/DL (ref ?–100)
NONHDLC SERPL-MCNC: 106 MG/DL (ref ?–130)
OSMOLALITY SERPL CALC.SUM OF ELEC: 294 MOSM/KG (ref 275–295)
POTASSIUM SERPL-SCNC: 4.2 MMOL/L (ref 3.5–5.1)
PROT SERPL-MCNC: 7.4 G/DL (ref 5.7–8.2)
SODIUM SERPL-SCNC: 142 MMOL/L (ref 136–145)
TRIGL SERPL-MCNC: 49 MG/DL (ref 30–149)
VLDLC SERPL CALC-MCNC: 8 MG/DL (ref 0–30)

## 2024-10-09 PROCEDURE — 36415 COLL VENOUS BLD VENIPUNCTURE: CPT

## 2024-10-09 PROCEDURE — 80053 COMPREHEN METABOLIC PANEL: CPT

## 2024-10-09 PROCEDURE — 80061 LIPID PANEL: CPT

## 2024-10-16 PROBLEM — K92.1 HEMATOCHEZIA: Status: ACTIVE | Noted: 2024-10-16

## 2024-10-16 PROBLEM — R19.4 CHANGE IN BOWEL HABITS: Status: ACTIVE | Noted: 2024-10-16

## 2024-11-21 ENCOUNTER — OFFICE VISIT (OUTPATIENT)
Dept: ORTHOPEDICS CLINIC | Facility: CLINIC | Age: 54
End: 2024-11-21
Payer: COMMERCIAL

## 2024-11-21 DIAGNOSIS — M54.41 CHRONIC RIGHT-SIDED LOW BACK PAIN WITH RIGHT-SIDED SCIATICA: Primary | ICD-10-CM

## 2024-11-21 DIAGNOSIS — G89.29 CHRONIC RIGHT-SIDED LOW BACK PAIN WITH RIGHT-SIDED SCIATICA: Primary | ICD-10-CM

## 2024-11-21 PROCEDURE — G2211 COMPLEX E/M VISIT ADD ON: HCPCS | Performed by: STUDENT IN AN ORGANIZED HEALTH CARE EDUCATION/TRAINING PROGRAM

## 2024-11-21 PROCEDURE — 99214 OFFICE O/P EST MOD 30 MIN: CPT | Performed by: STUDENT IN AN ORGANIZED HEALTH CARE EDUCATION/TRAINING PROGRAM

## 2024-11-22 NOTE — PROGRESS NOTES
[facility] - ORTHOPEDICS  [address] [place] [address], [address], [place] [address]  [address] [place] [address]  [phone]     FOLLOW-UP PATIENT VISIT    Name: Donna Bee   MRN: [id number]  Date: 11/21/2024     CC: low back pain      INTERVAL HISTORY:   Donna Bee is a 54 year old female  follow-up patient whom I have been treating conservatively. Patient returns today for reevaluation of low back pain.     Patient was initially see in August for right side low back pain. Symptoms briefly improved w/ medical treatment including self directed therapy and oral steroids. But patient continues to have symptoms.     Bowel and bladder symptoms: absent.    The patient has not had issues with balance and/or hand dexterity problems such as changes in penmanship or the use of buttons or zippers.    We have tried the following interventions thus far: steroids, therapy    ROS: No fever/chills or other constitutional issues.    PE:   There were no vitals filed for this visit.  Estimated body mass index is 21.74 kg/m² as calculated from the following:    Height as of an earlier encounter on 11/21/24: 5' 8\" (1.727 m).    Weight as of an earlier encounter on 11/21/24: 143 lb (64.9 kg).    On physical examination, she is awake, alert and oriented x 3 and in no acute distress. Mood, affect and language are normal. She appears well developed and well nourished.  She walks without a nonantalgic, nonmyelopathic, non-Trendelenburg gait. Motor strength testing of the lower extremities shows 5/5 strength in hip flexors, knee extensors, ankle dorsiflexors, toe extensor, and gastroc-soleus complex.   Sensation is intact to light touch L2-S1 distributions bilaterally. Reflexes none.       IMPRESSION: Donna Bee is a 54 year old female with back pain refractory to medical treatment    PLAN:   - MRI lumbar spine  - I will call patient after MRI    FOLLOW-UP:  We will see her back in follow-up in  after updated images, or sooner if any problems arise. Patient understands and agrees with plan.    Ruben Jauregui MD  Orthopedic Spine Surgeon  [facility]   [address] [place] [address], [address]  [email or web address]  t: [phone]   f: [phone]        This note was dictated using Dragon software.  While it was briefly proofread prior to completion, some grammatical, spelling, and word choice errors due to dictation may still occur.

## 2024-12-09 DIAGNOSIS — M54.50 LOW BACK PAIN, UNSPECIFIED BACK PAIN LATERALITY, UNSPECIFIED CHRONICITY, UNSPECIFIED WHETHER SCIATICA PRESENT: Primary | ICD-10-CM

## 2024-12-10 DIAGNOSIS — E78.00 HYPERCHOLESTEREMIA: ICD-10-CM

## 2024-12-10 RX ORDER — ATORVASTATIN CALCIUM 10 MG/1
10 TABLET, FILM COATED ORAL DAILY
Qty: 90 TABLET | Refills: 0 | Status: SHIPPED | OUTPATIENT
Start: 2024-12-10

## 2024-12-10 NOTE — TELEPHONE ENCOUNTER
LOV: 2/20/2024  for: Loose stool, hypercholesterolemia.  Patient advised to RTC on: 6 months.    Nov:01/29/2024    Medication Quantity Refills Start End   atorvastatin 10 MG Oral Tab 90 tablet 0 9/1/2024 --   Sig:   Take 1 tablet (10 mg total) by mouth daily

## 2024-12-11 ENCOUNTER — LAB ENCOUNTER (OUTPATIENT)
Dept: LAB | Age: 54
End: 2024-12-11
Attending: INTERNAL MEDICINE
Payer: COMMERCIAL

## 2024-12-11 DIAGNOSIS — K51.211 ULCERATIVE PROCTITIS WITH RECTAL BLEEDING (HCC): ICD-10-CM

## 2024-12-11 DIAGNOSIS — K62.89 PROCTITIS: ICD-10-CM

## 2024-12-11 LAB
ALBUMIN SERPL-MCNC: 4.3 G/DL (ref 3.2–4.8)
ALBUMIN/GLOB SERPL: 1.3 {RATIO} (ref 1–2)
ALP LIVER SERPL-CCNC: 76 U/L
ALT SERPL-CCNC: 19 U/L
ANION GAP SERPL CALC-SCNC: 7 MMOL/L (ref 0–18)
AST SERPL-CCNC: 22 U/L (ref ?–34)
BASOPHILS # BLD AUTO: 0.12 X10(3) UL (ref 0–0.2)
BASOPHILS NFR BLD AUTO: 1.5 %
BILIRUB SERPL-MCNC: 0.5 MG/DL (ref 0.3–1.2)
BUN BLD-MCNC: 12 MG/DL (ref 9–23)
CALCIUM BLD-MCNC: 10 MG/DL (ref 8.7–10.4)
CHLORIDE SERPL-SCNC: 106 MMOL/L (ref 98–112)
CO2 SERPL-SCNC: 27 MMOL/L (ref 21–32)
CREAT BLD-MCNC: 0.79 MG/DL
DEPRECATED HBV CORE AB SER IA-ACNC: 35 NG/ML
EGFRCR SERPLBLD CKD-EPI 2021: 89 ML/MIN/1.73M2 (ref 60–?)
EOSINOPHIL # BLD AUTO: 0.82 X10(3) UL (ref 0–0.7)
EOSINOPHIL NFR BLD AUTO: 10.4 %
ERYTHROCYTE [DISTWIDTH] IN BLOOD BY AUTOMATED COUNT: 12.6 %
FASTING STATUS PATIENT QL REPORTED: YES
GLOBULIN PLAS-MCNC: 3.2 G/DL (ref 2–3.5)
GLUCOSE BLD-MCNC: 81 MG/DL (ref 70–99)
HCT VFR BLD AUTO: 39.3 %
HGB BLD-MCNC: 13.1 G/DL
IMM GRANULOCYTES # BLD AUTO: 0.01 X10(3) UL (ref 0–1)
IMM GRANULOCYTES NFR BLD: 0.1 %
IRON SATN MFR SERPL: 23 %
IRON SERPL-MCNC: 76 UG/DL
LYMPHOCYTES # BLD AUTO: 2.97 X10(3) UL (ref 1–4)
LYMPHOCYTES NFR BLD AUTO: 37.7 %
MCH RBC QN AUTO: 31.8 PG (ref 26–34)
MCHC RBC AUTO-ENTMCNC: 33.3 G/DL (ref 31–37)
MCV RBC AUTO: 95.4 FL
MONOCYTES # BLD AUTO: 0.77 X10(3) UL (ref 0.1–1)
MONOCYTES NFR BLD AUTO: 9.8 %
NEUTROPHILS # BLD AUTO: 3.18 X10 (3) UL (ref 1.5–7.7)
NEUTROPHILS # BLD AUTO: 3.18 X10(3) UL (ref 1.5–7.7)
NEUTROPHILS NFR BLD AUTO: 40.5 %
OSMOLALITY SERPL CALC.SUM OF ELEC: 289 MOSM/KG (ref 275–295)
PLATELET # BLD AUTO: 350 10(3)UL (ref 150–450)
POTASSIUM SERPL-SCNC: 4.1 MMOL/L (ref 3.5–5.1)
PROT SERPL-MCNC: 7.5 G/DL (ref 5.7–8.2)
RBC # BLD AUTO: 4.12 X10(6)UL
SODIUM SERPL-SCNC: 140 MMOL/L (ref 136–145)
TOTAL IRON BINDING CAPACITY: 337 UG/DL (ref 250–425)
TRANSFERRIN SERPL-MCNC: 263 MG/DL (ref 250–380)
WBC # BLD AUTO: 7.9 X10(3) UL (ref 4–11)

## 2024-12-11 PROCEDURE — 80053 COMPREHEN METABOLIC PANEL: CPT

## 2024-12-11 PROCEDURE — 83540 ASSAY OF IRON: CPT

## 2024-12-11 PROCEDURE — 83550 IRON BINDING TEST: CPT

## 2024-12-11 PROCEDURE — 36415 COLL VENOUS BLD VENIPUNCTURE: CPT

## 2024-12-11 PROCEDURE — 85025 COMPLETE CBC W/AUTO DIFF WBC: CPT

## 2024-12-11 PROCEDURE — 82728 ASSAY OF FERRITIN: CPT

## (undated) NOTE — LETTER
23    Patient: Regine Servin  : 3/29/1970 Visit date: 2023    Dear  Speedy Barfield MD    Thank you for referring Regine Servin to my practice. Please find my assessment and plan below. Assessment   Prolapsed internal hemorrhoids, grade 3  (primary encounter diagnosis)    Plan   At today's office visit we treated a left lateral internal hemorrhoid. At today's visit, the patient underwent an uncomplicated application of a rubber band and injection of a 5% phenol solution into the base of the rubberbanded hemorrhoid. The patient tolerated the procedure well. The patient remained stable throughout the entire procedure within my office. The patient was asked to recover in my office for a few minutes prior to leaving for home. The patient should refrain from extreme sports or athletic activity, including heavy lifting. We will see this patient again in 2-3 weeks for further care and treatment.          Sincerely,       Rosenda Cruz MD   CC:   No Recipients

## (undated) NOTE — LETTER
04/01/22    Our office has been trying to contact you to discuss your recent test results. It is important that we reach you to discuss these results. Please contact our office at your earliest convenience. Thank you for your prompt attention to this matter. You may reach our office at [phone].      Sincerely,      Anupama Woodward  [address] , [facility] Darion Perry, [address] [place] [address]

## (undated) NOTE — LETTER
4/1/2019    [facility] [address]    Dear MsHunter Jessika Loges,     [facility] office has been trying to contact you to schedule a visit with your doctor to address important healthcare needs.   It is important that you contact our office at your

## (undated) NOTE — MR AVS SNAPSHOT
[facility], [address] [address] [id number]               Thank you for choosing us for your health care visit with Marlin Ghotra MD.  We are glad to serve you and happy to provide you with this cerrato have any risks to bone health.  These might be:  · Low calcium  · Soft bones caused by low vitamin D or problems using it (osteomalacia)  · Osteopenia  · Osteoporosis  · Rickets, in children  You may also need this test if you are at risk for low vitamin D vein in your arm. Does this test pose any risks? Taking a blood sample with a needle carries risks that include bleeding, infection, bruising, and a sense of lightheadedness.  When the needle pricks your arm, you may feel a slight stinging sensation or pa While this drug may be prescribed for children as young as 8years old for selected conditions, precautions do apply. What side effects may I notice from receiving this medicine?   Side effects that you should report to your doctor or health care profaugustine What should I tell my health care provider before I take this medicine?   They need to know if you have any of these conditions:  · frequently drink alcoholic beverages  · history of stroke, TIA  · kidney disease  · liver disease  · muscle aches or weakness Allergies as of Feb 02, 2017     Seasonal                 Today's Vital Signs     BP Pulse Temp Height Weight BMI    100/70 mmHg 62 98.1 °F (36.7 °C) (Oral) 68\" 139 lb 21.14 kg/m2         Current Medications          This list is accurate as of: 2/2/17 10

## (undated) NOTE — LETTER
23    Patient: Regine Servin  : 3/29/1970 Visit date: 2023    Dear  Speedy Barfield MD    Thank you for referring Regine Servin to my practice. Please find my assessment and plan below. Assessment   Prolapsed internal hemorrhoids, grade 3  (primary encounter diagnosis)    Plan   At today's office visit we treated right esther-lateral internal hemorrhoid. At today's visit, the patient underwent an uncomplicated application of a rubber band and injection of a 5% phenol solution into the base of the rubberbanded hemorrhoid. The patient tolerated the procedure well. The patient remained stable throughout the entire procedure within my office. The patient was asked to recover in my office for a few minutes prior to leaving for home. The patient should refrain from extreme sports or athletic activity, including heavy lifting. We will see this patient again in 2-3 weeks for further care and treatment.        Sincerely,       Rosenda Cruz MD   CC:   No Recipients

## (undated) NOTE — LETTER
22        Josef Robles   : 1970      Dear Shruthi Goodwin,    [facility] office has been trying to contact you to discuss Dr Snow Blunt response to your 3/28/22 call to our office. We left multiple voice mail messages requesting a call back with no response and sent a MyChart message which you have not viewed. It is important that we reach you to discuss this information. Please contact our office as soon as you receive this letter and ask to speak with a triage nurse. Thank you for your prompt attention to this matter. You may reach our office at [phone].      Sincerely,      [facility]  [facility], [facility] Darion Perry, [address] [place] [address]

## (undated) NOTE — LETTER
23    Patient: Leroy Velez  : 3/29/1970 Visit date: 2023    Dear  Alka Murray MD    Thank you for referring Leroy Velez to my practice. Please find my assessment and plan below. Assessment   Grade 3 internal hemorrhoids  Rectocele  Rectal bleeding      Plan   The patient presents for evaluation of rectal bleeding. The patient states since February of this year, she has been having bright red blood per rectum on almost daily basis. She states she has not seen blood in the toilet, but has bright red blood per rectum when wiping following bowel movements. She only has bleeding with bowel movements, not at other times. She states she has known hemorrhoids. The patient denies prolapsing of her hemorrhoids. She denies rectal pain. She denies perianal pruritus. The patient states she does not have a history of diarrhea or constipation. She states her stools are looser, but not liquidy. She denies hematochezia, melena or mucus in her stools. She has experienced a change in bowel habits since starting a statin approximately 4 years ago. She states her bowel movements are narrower and more frequent. At times she does not feel fully emptied. She states prior to starting a statin, she would only have 1 bowel movement per day. The patient denies any abdominal pain or distention. The patient does not have any significant past medical history. She does not take any blood thinners. The patient denies a personal history of IBS, Crohn's disease or ulcerative colitis. She had 2 vaginal deliveries. She states the first delivery was somewhat traumatic. She delivered both children with a midwife. They were 8.5 pounds and 9 pounds. The patient is up-to-date with colonoscopy. She had a colonoscopy with Dr. Delmis Patiño on 2021. Internal hemorrhoids and a sessile serrated polyp at that time.     The patient denies any first or second-degree relatives with a history of colon cancer/colon polyps. The patient denies any first or second-degree relatives with a history of uterine cancer. Clinical exam of the rectum including anoscopy reveals no external hemorrhoids, fissures, fistulae or abscesses. She does have an episiotomy scar to the right of the anterior midline. Anoscopy reveals grade 3 internal hemorrhoids in the right anterior, right posterior and left lateral locations. There is no evidence of proctitis or Crohn's disease. Digital rectal exam reveals a large rectocele. There are no palpable masses. Basal tone 3/4 and maximum squeeze pressure 3/4. I recommend the patient undergo rubber band ligation of her internal hemorrhoids to help alleviate her rectal bleeding. She will require 4 treatments at least 2 weeks apart each. I also recommend she talk with her primary care provider regarding switching her statin. Her bowel movements may return to normal with a different statin. All risks, benefits, complications and alternatives to the proposed procedure(s) were fully discussed with the patient. All questions from the patient were answered in detail. A description of the procedure(s) possible outcomes was fully discussed. The patient seemed to understand the conversation and its details. Consent for the procedure(s) was confirmed with the patient.           Sincerely,       Esther García MD   CC: No Recipients

## (undated) NOTE — LETTER
[facility]   Date:   5/1/2023     Name:   Pearl Mckeon    YOB: 1970   MRN:   [id number]       WHERE IS YOUR PAIN NOW? Ashkan the areas on your body where you feel the described sensations. Use the appropriate symbol. Lydia Helms the areas of radiation. Include all affected areas. Just to complete the picture, please draw in the face. ACHE:  ^ ^ ^   NUMBNESS:  0000   PINS & NEEDLES:  = = = =                              ^ ^ ^                       0000              = = = =                                    ^ ^ ^                       0000            = = = =      BURNING:  XXXX   STABBING: ////                  XXXX                ////                         XXXX          ////     Please ashkan the line below indicating your degree of pain right now  with 0 being no pain 10 being the worst pain possible.                                          0             1             2              3             4              5              6              7             8             9             10         Patient Signature: